# Patient Record
Sex: FEMALE | Race: WHITE | NOT HISPANIC OR LATINO | Employment: FULL TIME
[De-identification: names, ages, dates, MRNs, and addresses within clinical notes are randomized per-mention and may not be internally consistent; named-entity substitution may affect disease eponyms.]

---

## 2017-07-01 ENCOUNTER — HEALTH MAINTENANCE LETTER (OUTPATIENT)
Age: 22
End: 2017-07-01

## 2019-10-03 ENCOUNTER — HEALTH MAINTENANCE LETTER (OUTPATIENT)
Age: 24
End: 2019-10-03

## 2021-01-15 ENCOUNTER — HEALTH MAINTENANCE LETTER (OUTPATIENT)
Age: 26
End: 2021-01-15

## 2021-01-25 ENCOUNTER — OFFICE VISIT (OUTPATIENT)
Dept: FAMILY MEDICINE | Facility: CLINIC | Age: 26
End: 2021-01-25

## 2021-01-25 VITALS
HEIGHT: 67 IN | HEART RATE: 90 BPM | OXYGEN SATURATION: 100 % | BODY MASS INDEX: 27.18 KG/M2 | WEIGHT: 173.2 LBS | DIASTOLIC BLOOD PRESSURE: 70 MMHG | SYSTOLIC BLOOD PRESSURE: 120 MMHG | TEMPERATURE: 97.9 F

## 2021-01-25 DIAGNOSIS — F41.9 ANXIETY: ICD-10-CM

## 2021-01-25 DIAGNOSIS — F33.9 EPISODE OF RECURRENT MAJOR DEPRESSIVE DISORDER, UNSPECIFIED DEPRESSION EPISODE SEVERITY (H): Primary | ICD-10-CM

## 2021-01-25 DIAGNOSIS — G25.81 RESTLESS LEG SYNDROME: ICD-10-CM

## 2021-01-25 DIAGNOSIS — F50.89 OTHER DISORDER OF EATING: ICD-10-CM

## 2021-01-25 DIAGNOSIS — F55.2 CHRONIC LAXATIVE ABUSE: ICD-10-CM

## 2021-01-25 PROBLEM — Z71.89 ACP (ADVANCE CARE PLANNING): Status: ACTIVE | Noted: 2021-01-25

## 2021-01-25 PROBLEM — Z76.89 HEALTH CARE HOME: Status: ACTIVE | Noted: 2021-01-25

## 2021-01-25 LAB
% GRANULOCYTES: 67.1 %
ALBUMIN SERPL-MCNC: 4.3 G/DL (ref 3.6–5.1)
ALP SERPL-CCNC: 52 U/L (ref 33–130)
ALT 1742-6: 9 U/L (ref 0–32)
AST 1920-8: 12 U/L (ref 0–35)
BILIRUB SERPL-MCNC: 0.5 MG/DL (ref 0.2–1.2)
BILIRUBIN DIRECT: 0.2 MG/DL (ref 0.1–0.4)
BUN SERPL-MCNC: 11 MG/DL (ref 7–25)
BUN/CREATININE RATIO: 15.3 (ref 6–22)
CALCIUM SERPL-MCNC: 9.4 MG/DL (ref 8.6–10.3)
CHLORIDE SERPLBLD-SCNC: 104.9 MMOL/L (ref 98–110)
CO2 SERPL-SCNC: 27.9 MMOL/L (ref 20–32)
CREAT SERPL-MCNC: 0.72 MG/DL (ref 0.6–1.3)
GLUCOSE SERPL-MCNC: 78 MG/DL (ref 60–99)
HCT VFR BLD AUTO: 39.5 % (ref 35–47)
HEMOGLOBIN: 13 G/DL (ref 11.7–15.7)
LYMPHOCYTES NFR BLD AUTO: 27 %
MCH RBC QN AUTO: 30.8 PG (ref 26–33)
MCHC RBC AUTO-ENTMCNC: 32.9 G/DL (ref 31–36)
MCV RBC AUTO: 93.7 FL (ref 78–100)
MONOCYTES NFR BLD AUTO: 5.9 %
PLATELET COUNT - QUEST: 287 10^9/L (ref 150–375)
POTASSIUM SERPL-SCNC: 4.48 MMOL/L (ref 3.5–5.3)
PROT SERPL-MCNC: 6.7 G/DL (ref 6.1–8.1)
RBC # BLD AUTO: 4.22 10*12/L (ref 3.8–5.2)
SODIUM SERPL-SCNC: 140.1 MMOL/L (ref 135–146)
WBC # BLD AUTO: 6.9 10*9/L (ref 4–11)

## 2021-01-25 PROCEDURE — 36415 COLL VENOUS BLD VENIPUNCTURE: CPT | Performed by: FAMILY MEDICINE

## 2021-01-25 PROCEDURE — 84443 ASSAY THYROID STIM HORMONE: CPT | Mod: 90 | Performed by: FAMILY MEDICINE

## 2021-01-25 PROCEDURE — 85025 COMPLETE CBC W/AUTO DIFF WBC: CPT | Performed by: FAMILY MEDICINE

## 2021-01-25 PROCEDURE — 80048 BASIC METABOLIC PNL TOTAL CA: CPT | Performed by: FAMILY MEDICINE

## 2021-01-25 PROCEDURE — 83735 ASSAY OF MAGNESIUM: CPT | Mod: 90 | Performed by: FAMILY MEDICINE

## 2021-01-25 PROCEDURE — 80076 HEPATIC FUNCTION PANEL: CPT | Performed by: FAMILY MEDICINE

## 2021-01-25 PROCEDURE — 99204 OFFICE O/P NEW MOD 45 MIN: CPT | Performed by: FAMILY MEDICINE

## 2021-01-25 RX ORDER — MULTIVITAMIN WITH IRON
1 TABLET ORAL DAILY
COMMUNITY
End: 2021-06-07

## 2021-01-25 RX ORDER — CITALOPRAM HYDROBROMIDE 40 MG/1
40 TABLET ORAL DAILY
COMMUNITY
Start: 2020-06-13 | End: 2021-01-25

## 2021-01-25 SDOH — HEALTH STABILITY: MENTAL HEALTH: HOW MANY STANDARD DRINKS CONTAINING ALCOHOL DO YOU HAVE ON A TYPICAL DAY?: 1 OR 2

## 2021-01-25 SDOH — HEALTH STABILITY: MENTAL HEALTH: HOW OFTEN DO YOU HAVE 6 OR MORE DRINKS ON ONE OCCASION?: NOT ASKED

## 2021-01-25 SDOH — HEALTH STABILITY: MENTAL HEALTH: HOW OFTEN DO YOU HAVE A DRINK CONTAINING ALCOHOL?: MONTHLY OR LESS

## 2021-01-25 ASSESSMENT — PATIENT HEALTH QUESTIONNAIRE - PHQ9
SUM OF ALL RESPONSES TO PHQ QUESTIONS 1-9: 14
5. POOR APPETITE OR OVEREATING: MORE THAN HALF THE DAYS

## 2021-01-25 ASSESSMENT — MIFFLIN-ST. JEOR: SCORE: 1550.32

## 2021-01-25 ASSESSMENT — ANXIETY QUESTIONNAIRES
6. BECOMING EASILY ANNOYED OR IRRITABLE: SEVERAL DAYS
IF YOU CHECKED OFF ANY PROBLEMS ON THIS QUESTIONNAIRE, HOW DIFFICULT HAVE THESE PROBLEMS MADE IT FOR YOU TO DO YOUR WORK, TAKE CARE OF THINGS AT HOME, OR GET ALONG WITH OTHER PEOPLE: SOMEWHAT DIFFICULT
2. NOT BEING ABLE TO STOP OR CONTROL WORRYING: MORE THAN HALF THE DAYS
GAD7 TOTAL SCORE: 13
1. FEELING NERVOUS, ANXIOUS, OR ON EDGE: MORE THAN HALF THE DAYS
7. FEELING AFRAID AS IF SOMETHING AWFUL MIGHT HAPPEN: MORE THAN HALF THE DAYS
3. WORRYING TOO MUCH ABOUT DIFFERENT THINGS: MORE THAN HALF THE DAYS
5. BEING SO RESTLESS THAT IT IS HARD TO SIT STILL: MORE THAN HALF THE DAYS

## 2021-01-25 NOTE — NURSING NOTE
Eva is here to establish care and go over medications    Pre-visit Screening:  Immunizations:  up to date declines flu shot today  Colonoscopy:  NA  Mammogram: NA  Asthma Action Test/Plan:  NA  PHQ9:  Done today  GAD7:  Done today  Questioned patient about current smoking habits Pt. quit smoking some time ago.  Ok to leave detailed message on voice mail for today's visit only Yes, phone # [phone]

## 2021-01-25 NOTE — LETTER
[facility]  [address]  [address]  [facility] [address] [phone]      January 25, 2021      Eva Pennington  [address]  [facility] [address]      EMERGENCY CARE PLAN  Presenting Problem Treatment Plan   Questions or concerns during clinic hours I will call the clinic directly:    [facility]  [address] , [address]  [place] [address]  [phone]   Questions or concerns outside clinic hours  I will call the 24 hour line at [phone]   Patient needs to schedule an appointment  I will call the  scheduling line at [phone]   Same day treatment   I will call the clinic first, then  urgent care and/or  express care if needed   Clinic Care Coordinators China Roland RN:  [phone]  [facility] Clinical Support Staff: [phone]    Crisis Services:  Behavioral or Mental Health P ([facility])   [phone]   Emergency treatment--Immediately CALL 551

## 2021-01-25 NOTE — PROGRESS NOTES
Assessment & Plan   Problem List Items Addressed This Visit        Musculoskeletal and Integumentary    Restless leg syndrome    Relevant Medications    sertraline (ZOLOFT) 50 MG tablet    Other Relevant Orders    VENOUS COLLECTION (Completed)    Magnesium (QUEST)       Behavioral    Episode of recurrent major depressive disorder, unspecified depression episode severity (H) - Primary    Relevant Medications    sertraline (ZOLOFT) 50 MG tablet    Other Relevant Orders    MENTAL HEALTH REFERRAL  - Adult; Psychiatry, Outpatient Treatment, Assessments and Testing; General Psychological Testing; Other: [facility] [phone]; We will contact you to schedule the appointment or please call with any questions; ...    Other disorder of eating    Relevant Orders    HEMOGRAM PLATELET DIFF (BFP) (Completed)    Hepatic Panel (BFP)    TSH with free T4 reflex (QUEST)    MENTAL HEALTH REFERRAL  - Adult; Psychiatry, Outpatient Treatment, Assessments and Testing; General Psychological Testing; Other: [facility] [phone]; We will contact you to schedule the appointment or please call with any questions; ...    Chronic laxative abuse    Relevant Orders    Basic Metabolic Panel (BFP)    MENTAL HEALTH REFERRAL  - Adult; Psychiatry, Outpatient Treatment, Assessments and Testing; General Psychological Testing; Other: [facility] [phone]; We will contact you to schedule the appointment or please call with any questions; ...       Other    Anxiety    Relevant Medications    sertraline (ZOLOFT) 50 MG tablet    Other Relevant Orders    MENTAL HEALTH REFERRAL  - Adult; Psychiatry, Outpatient Treatment, Assessments and Testing; General Psychological Testing; Other: [facility] [phone]; We will contact you to schedule the appointment or please call with any questions; ...         1. Episode of recurrent major depressive disorder, unspecified depression episode severity (H)  She would like to change  to a new medications, stop celexa by decreasing dose to 1/2 dose daily for a week, then start sertraline. Side effects including FDA warning discussed. See me back to recheck in 1-2 weeks. Crisis hotline numbers given. I will also refer her to see psychology--ensure correct diagnosis, consider seeing psychiatry.   She also has no plan, denies suicidality but describes mostly feeling inadequate.    - sertraline (ZOLOFT) 50 MG tablet; Take 1 tablet (50 mg) by mouth daily  Dispense: 90 tablet; Refill: 0  - MENTAL HEALTH REFERRAL  - Adult; Psychiatry, Outpatient Treatment, Assessments and Testing; General Psychological Testing; Other: [facility] [phone]; We will contact you to schedule the appointment or please call with any questions; ...    2. Anxiety  See above.  - sertraline (ZOLOFT) 50 MG tablet; Take 1 tablet (50 mg) by mouth daily  Dispense: 90 tablet; Refill: 0  - MENTAL HEALTH REFERRAL  - Adult; Psychiatry, Outpatient Treatment, Assessments and Testing; General Psychological Testing; Other: [facility] [phone]; We will contact you to schedule the appointment or please call with any questions; ...    3. Other disorder of eating  Check labs, referral to [facility], consider referral to eating disorders program. She will discuss with them further.  - HEMOGRAM PLATELET DIFF (BFP)  - Hepatic Panel (BFP)  - TSH with free T4 reflex (QUEST)  - MENTAL HEALTH REFERRAL  - Adult; Psychiatry, Outpatient Treatment, Assessments and Testing; General Psychological Testing; Other: [facility] [phone]; We will contact you to schedule the appointment or please call with any questions; ...    4. Restless leg syndrome  Check mg.    5. Chronic laxative abuse  Check labs.  - Basic Metabolic Panel (BFP)  - MENTAL HEALTH REFERRAL  - Adult; Psychiatry, Outpatient Treatment, Assessments and Testing; General Psychological Testing; Other: [facility] [phone]; We will contact  "you to schedule the appointment or please call with any questions; ...    6}     BMI:   Estimated body mass index is 27.54 kg/m  as calculated from the following:    Height as of this encounter: 1.689 m (5' 6.5\").    Weight as of this encounter: 78.6 kg (173 lb 3.2 oz).         FUTURE APPOINTMENTS:       - Follow-up office or E-visit in 1-2 weeks    No follow-ups on file.    Taya Meredith MD  [facility]    Subjective     Nursing Notes:   Norah Begum CMA  1/25/2021 11:08 AM  Signed  Eva is here to establish care and go over medications    Pre-visit Screening:  Immunizations:  up to date declines flu shot today  Colonoscopy:  NA  Mammogram: NA  Asthma Action Test/Plan:  NA  PHQ9:  Done today  GAD7:  Done today  Questioned patient about current smoking habits Pt. quit smoking some time ago.  Ok to leave detailed message on voice mail for today's visit only Yes, phone # [phone]           Eva Pennington is a 25 year old female who presents to clinic today for the following health issues   HPI     Has been on celexa for a few years but can't tell the difference. Has been on prozac in the past. Had a bad reaction to this--panic attacks. Also has some anxiety. Hasn't been told she is bipolar. Has seen a therapist in the past. Family history anxiety.  Interested in zoloft. Has some some friends on this.  Considering pregnancy within the next year, was previously taking PNV, will start again.  Takes mg for RLS.  Also feels that she might have an eating disorder--has binged and purged, also severely cut back on her calories. Does not excessively exercise. She also regularly uses laxatives, too much, she feels. Last time was last night.   She has had passing suicidal ideation but no plan and is able to contract that she will not harm herself. Mostly she said she has thoughts of worthlessness or inadequacy.  Has been self-hospitalized once in the past few years ago.    Review of Systems " "  Constitutional, HEENT, cardiovascular, pulmonary, gi and gu systems are negative, except as otherwise noted.      Objective    /70 (BP Location: Left arm, Patient Position: Sitting, Cuff Size: Adult Large)   Pulse 90   Temp 97.9  F (36.6  C) (Oral)   Ht 1.689 m (5' 6.5\")   Wt 78.6 kg (173 lb 3.2 oz)   LMP 12/28/2020   SpO2 100%   BMI 27.54 kg/m    Body mass index is 27.54 kg/m .  Physical Exam   GENERAL: healthy, alert and no distress  EYES: Eyes grossly normal to inspection, PERRL and conjunctivae and sclerae normal  HENT: ear canals and TM's normal, nose and mouth without ulcers or lesions  NECK: no adenopathy, no asymmetry, masses, or scars and thyroid normal to palpation  RESP: lungs clear to auscultation - no rales, rhonchi or wheezes  BREAST: normal without masses, tenderness or nipple discharge and no palpable axillary masses or adenopathy  CV: regular rate and rhythm, normal S1 S2, no S3 or S4, no murmur, click or rub, no peripheral edema and peripheral pulses strong  ABDOMEN: soft, nontender, no hepatosplenomegaly, no masses and bowel sounds normal  MS: no gross musculoskeletal defects noted, no edema  SKIN: no suspicious lesions or rashes  NEURO: Normal strength and tone, mentation intact and speech normal  PSYCH: mentation appears normal, affect normal/bright    Results for orders placed or performed in visit on 01/25/21   HEMOGRAM PLATELET DIFF (BFP)     Status: None   Result Value Ref Range    WBC 6.9 4.0 - 11 10*9/L    RBC Count 4.22 3.8 - 5.2 10*12/L    Hemoglobin 13.0 11.7 - 15.7 g/dL    Hematocrit 39.5 35.0 - 47.0 %    MCV 93.7 78 - 100 fL    MCH 30.8 26 - 33 pg    MCHC 32.9 31 - 36 g/dL    Platelet Count 287 150 - 375 10^9/L    % Granulocytes 67.1 %    % Lymphocytes 27.0 %    % Monocytes 5.9 %         "

## 2021-01-25 NOTE — PATIENT INSTRUCTIONS
Assessment & Plan   Problem List Items Addressed This Visit        Musculoskeletal and Integumentary    Restless leg syndrome    Relevant Medications    sertraline (ZOLOFT) 50 MG tablet    Other Relevant Orders    VENOUS COLLECTION (Completed)    Magnesium (QUEST)       Behavioral    Episode of recurrent major depressive disorder, unspecified depression episode severity (H) - Primary    Relevant Medications    sertraline (ZOLOFT) 50 MG tablet    Other Relevant Orders    MENTAL HEALTH REFERRAL  - Adult; Psychiatry, Outpatient Treatment, Assessments and Testing; General Psychological Testing; Other: [facility] [phone]; We will contact you to schedule the appointment or please call with any questions; ...    Other disorder of eating    Relevant Orders    HEMOGRAM PLATELET DIFF (BFP) (Completed)    Hepatic Panel (BFP)    TSH with free T4 reflex (QUEST)    MENTAL HEALTH REFERRAL  - Adult; Psychiatry, Outpatient Treatment, Assessments and Testing; General Psychological Testing; Other: [facility] [phone]; We will contact you to schedule the appointment or please call with any questions; ...    Chronic laxative abuse    Relevant Orders    Basic Metabolic Panel (BFP)    MENTAL HEALTH REFERRAL  - Adult; Psychiatry, Outpatient Treatment, Assessments and Testing; General Psychological Testing; Other: [facility] [phone]; We will contact you to schedule the appointment or please call with any questions; ...       Other    Anxiety    Relevant Medications    sertraline (ZOLOFT) 50 MG tablet    Other Relevant Orders    MENTAL HEALTH REFERRAL  - Adult; Psychiatry, Outpatient Treatment, Assessments and Testing; General Psychological Testing; Other: [facility] [phone]; We will contact you to schedule the appointment or please call with any questions; ...         1. Episode of recurrent major depressive disorder, unspecified depression episode severity (H)  She would like to change  to a new medications, stop celexa by decreasing dose to 1/2 dose daily for a week, then start sertraline. Side effects including FDA warning discussed. See me back to recheck in 1-2 weeks. Crisis hotline numbers given. I will also refer her to see psychology--ensure correct diagnosis, consider seeing psychiatry.   She also has no plan, denies suicidality but describes mostly feeling inadequate.    - sertraline (ZOLOFT) 50 MG tablet; Take 1 tablet (50 mg) by mouth daily  Dispense: 90 tablet; Refill: 0  - MENTAL HEALTH REFERRAL  - Adult; Psychiatry, Outpatient Treatment, Assessments and Testing; General Psychological Testing; Other: [facility] [phone]; We will contact you to schedule the appointment or please call with any questions; ...    2. Anxiety  See above.  - sertraline (ZOLOFT) 50 MG tablet; Take 1 tablet (50 mg) by mouth daily  Dispense: 90 tablet; Refill: 0  - MENTAL HEALTH REFERRAL  - Adult; Psychiatry, Outpatient Treatment, Assessments and Testing; General Psychological Testing; Other: [facility] [phone]; We will contact you to schedule the appointment or please call with any questions; ...    3. Other disorder of eating  Check labs, referral to [facility], consider referral to eating disorders program. She will discuss with them further.  - HEMOGRAM PLATELET DIFF (BFP)  - Hepatic Panel (BFP)  - TSH with free T4 reflex (QUEST)  - MENTAL HEALTH REFERRAL  - Adult; Psychiatry, Outpatient Treatment, Assessments and Testing; General Psychological Testing; Other: [facility] [phone]; We will contact you to schedule the appointment or please call with any questions; ...    4. Restless leg syndrome  Check mg.    5. Chronic laxative abuse  Check labs.  - Basic Metabolic Panel (BFP)  - MENTAL HEALTH REFERRAL  - Adult; Psychiatry, Outpatient Treatment, Assessments and Testing; General Psychological Testing; Other: [facility] [phone]; We will contact  "you to schedule the appointment or please call with any questions; ...    6}     BMI:   Estimated body mass index is 27.54 kg/m  as calculated from the following:    Height as of this encounter: 1.689 m (5' 6.5\").    Weight as of this encounter: 78.6 kg (173 lb 3.2 oz).         FUTURE APPOINTMENTS:       - Follow-up office or E-visit in 1-2 weeks    No follow-ups on file.    Taya Meredith MD  [facility]    "

## 2021-01-26 LAB
MAGNESIUM SERPL-MCNC: 2.3 MG/DL (ref 1.5–2.5)
TSH SERPL-ACNC: 1.88 MIU/L

## 2021-01-26 ASSESSMENT — ANXIETY QUESTIONNAIRES: GAD7 TOTAL SCORE: 13

## 2021-02-10 ENCOUNTER — OFFICE VISIT (OUTPATIENT)
Dept: FAMILY MEDICINE | Facility: CLINIC | Age: 26
End: 2021-02-10

## 2021-02-10 VITALS
OXYGEN SATURATION: 99 % | DIASTOLIC BLOOD PRESSURE: 70 MMHG | HEIGHT: 67 IN | BODY MASS INDEX: 27.09 KG/M2 | WEIGHT: 172.6 LBS | SYSTOLIC BLOOD PRESSURE: 110 MMHG | TEMPERATURE: 98.3 F | HEART RATE: 74 BPM

## 2021-02-10 DIAGNOSIS — F41.9 ANXIETY: Primary | ICD-10-CM

## 2021-02-10 DIAGNOSIS — F33.9 EPISODE OF RECURRENT MAJOR DEPRESSIVE DISORDER, UNSPECIFIED DEPRESSION EPISODE SEVERITY (H): ICD-10-CM

## 2021-02-10 DIAGNOSIS — G25.81 RESTLESS LEG SYNDROME: ICD-10-CM

## 2021-02-10 DIAGNOSIS — F32.89 OTHER DEPRESSION: ICD-10-CM

## 2021-02-10 PROCEDURE — 99213 OFFICE O/P EST LOW 20 MIN: CPT | Performed by: FAMILY MEDICINE

## 2021-02-10 ASSESSMENT — ANXIETY QUESTIONNAIRES
GAD7 TOTAL SCORE: 3
5. BEING SO RESTLESS THAT IT IS HARD TO SIT STILL: NOT AT ALL
7. FEELING AFRAID AS IF SOMETHING AWFUL MIGHT HAPPEN: NOT AT ALL
2. NOT BEING ABLE TO STOP OR CONTROL WORRYING: NOT AT ALL
IF YOU CHECKED OFF ANY PROBLEMS ON THIS QUESTIONNAIRE, HOW DIFFICULT HAVE THESE PROBLEMS MADE IT FOR YOU TO DO YOUR WORK, TAKE CARE OF THINGS AT HOME, OR GET ALONG WITH OTHER PEOPLE: SOMEWHAT DIFFICULT
6. BECOMING EASILY ANNOYED OR IRRITABLE: SEVERAL DAYS
3. WORRYING TOO MUCH ABOUT DIFFERENT THINGS: SEVERAL DAYS
1. FEELING NERVOUS, ANXIOUS, OR ON EDGE: SEVERAL DAYS

## 2021-02-10 ASSESSMENT — PATIENT HEALTH QUESTIONNAIRE - PHQ9
5. POOR APPETITE OR OVEREATING: NOT AT ALL
SUM OF ALL RESPONSES TO PHQ QUESTIONS 1-9: 9

## 2021-02-10 ASSESSMENT — MIFFLIN-ST. JEOR: SCORE: 1547.6

## 2021-02-10 NOTE — PROGRESS NOTES
"Assessment & Plan   Problem List Items Addressed This Visit        Musculoskeletal and Integumentary    Restless leg syndrome       Behavioral    Depression    Episode of recurrent major depressive disorder, unspecified depression episode severity (H)       Other    Anxiety - Primary         1. Anxiety  Continue medications, this is working ok. Call if side effects or problems. See me back in 3 months.    2. Other depression      3. Restless leg syndrome      4. Episode of recurrent major depressive disorder, unspecified depression episode severity (H)        [address]}       FUTURE APPOINTMENTS:       - Follow-up visit in 3 months    No follow-ups on file.    Taya Meredith MD  [facility]    Subjective     Nursing Notes:   Norah Begum, GALLO  2/10/2021  5:17 PM  Signed  Eva is here for med check    Pre-visit Screening:  Immunizations:  up to date  Colonoscopy:  NA  Mammogram: NA  Asthma Action Test/Plan:  NA  PHQ9:  Done today  GAD7:  Done today  Questioned patient about current smoking habits Pt. quit smoking some time ago.  Ok to leave detailed message on voice mail for today's visit only Yes, phone # [phone]           Eva Pennington is a 25 year old female who presents to clinic today for the following health issues   HPI     Here to followup on her anxiety. She has stopped the celexa and is now on sertraline, this is ok but makes her tired. She will try changing to another time     Review of Systems   Constitutional, HEENT, cardiovascular, pulmonary, gi and gu systems are negative, except as otherwise noted.  Except fatigue, bloating, anxiety      Objective    /70 (BP Location: Left arm, Patient Position: Sitting, Cuff Size: Adult Large)   Pulse 74   Temp 98.3  F (36.8  C) (Oral)   Ht 1.689 m (5' 6.5\")   Wt 78.3 kg (172 lb 9.6 oz)   SpO2 99%   BMI 27.44 kg/m    Body mass index is 27.44 kg/m .  Physical Exam   GENERAL: healthy, alert and no distress  MS: no gross " musculoskeletal defects noted, no edema  PSYCH: mentation appears normal, affect normal/bright

## 2021-02-10 NOTE — NURSING NOTE
Eva is here for med check    Pre-visit Screening:  Immunizations:  up to date  Colonoscopy:  NA  Mammogram: NA  Asthma Action Test/Plan:  NA  PHQ9:  Done today  GAD7:  Done today  Questioned patient about current smoking habits Pt. quit smoking some time ago.  Ok to leave detailed message on voice mail for today's visit only Yes, phone # [phone]

## 2021-02-10 NOTE — PATIENT INSTRUCTIONS
Assessment & Plan   Problem List Items Addressed This Visit        Musculoskeletal and Integumentary    Restless leg syndrome       Behavioral    Depression    Episode of recurrent major depressive disorder, unspecified depression episode severity (H)       Other    Anxiety - Primary         1. Anxiety  Continue medications, this is working ok. Call if side effects or problems. See me back in 3 months.    2. Other depression      3. Restless leg syndrome      4. Episode of recurrent major depressive disorder, unspecified depression episode severity (H)        [address]}       FUTURE APPOINTMENTS:       - Follow-up visit in 3 months    No follow-ups on file.    Taya Meredtih MD  [facility]

## 2021-02-11 ASSESSMENT — ANXIETY QUESTIONNAIRES: GAD7 TOTAL SCORE: 3

## 2021-04-26 PROBLEM — F33.9 EPISODE OF RECURRENT MAJOR DEPRESSIVE DISORDER, UNSPECIFIED DEPRESSION EPISODE SEVERITY (H): Status: RESOLVED | Noted: 2021-01-25 | Resolved: 2021-04-26

## 2021-04-26 NOTE — PROGRESS NOTES
SUBJECTIVE:   CC: Eva Pennington is an 25 year old woman who presents for preventive health visit.       Patient has been advised of split billing requirements and indicates understanding: Yes  Healthy Habits:    Do you get at least three servings of calcium containing foods daily (dairy, green leafy vegetables, etc.)? yes    Amount of exercise or daily activities, outside of work: walks, gym    Problems taking medications regularly No    Medication side effects: No    Have you had an eye exam in the past two years? yes    Do you see a dentist twice per year? yes    Do you have sleep apnea, excessive snoring or daytime drowsiness?no    Today's PHQ-2 Score:   PHQ-2 ( 1999 [employer]) 6/3/2014 8/16/2013   Q1: Little interest or pleasure in doing things 0 0   Q2: Feeling down, depressed or hopeless 0 1   PHQ-2 Score 0 1       Do you feel safe in your environment? Yes        Social History     Tobacco Use     Smoking status: Former Smoker     Smokeless tobacco: Former User   Substance Use Topics     Alcohol use: Yes     Frequency: Monthly or less     Drinks per session: 1 or 2     If you drink alcohol do you typically have >3 drinks per day or >7 drinks per week? No                     Reviewed orders with patient.  Reviewed health maintenance and updated orders accordingly - Yes  BP Readings from Last 3 Encounters:   04/28/21 106/70   02/10/21 110/70   01/25/21 120/70    Wt Readings from Last 3 Encounters:   04/28/21 78 kg (172 lb)   02/10/21 78.3 kg (172 lb 9.6 oz)   01/25/21 78.6 kg (173 lb 3.2 oz)                  Patient Active Problem List   Diagnosis     Anxiety     Acne     Depression     CARDIOVASCULAR SCREENING; LDL GOAL LESS THAN 130     Suicidal ideations     Contraceptive management     ACP (advance care planning)     Health Care Home     Other disorder of eating     Restless leg syndrome     Chronic laxative abuse     Past Surgical History:   Procedure Laterality Date     NO HISTORY OF SURGERY          Social History     Tobacco Use     Smoking status: Former Smoker     Smokeless tobacco: Former User   Substance Use Topics     Alcohol use: Yes     Frequency: Monthly or less     Drinks per session: 1 or 2     Family History   Problem Relation Age of Onset     Cancer Maternal Grandfather         Bone CA     Heart Disease Maternal Grandmother         Abnormal rythem     Neurologic Disorder Paternal Grandmother         Brain Annerysym-- Lived through it also had Aeortic annerysym and alive at 62     Hypertension Paternal Grandmother      Heart Disease Maternal Aunt         Enlarged heart  at age 44         Current Outpatient Medications   Medication Sig Dispense Refill     magnesium 250 MG tablet Take 1 tablet by mouth daily       sertraline (ZOLOFT) 50 MG tablet Take 1 tablet (50 mg) by mouth daily 90 tablet 1       Pertinent mammograms are reviewed under the imaging tab.    Pertinent mammograms are reviewed under the imaging tab.  History of abnormal Pap smear: NO - age 21-29 PAP every 3 years recommended, due now, has her period.     Reviewed and updated as needed this visit by clinical staff  Tobacco  Allergies  Meds  Problems  Med Hx  Surg Hx  Fam Hx          Reviewed and updated as needed this visit by Provider                Past Medical History:   Diagnosis Date     Suicidal ideation 2/3/14    Hospitalized at [facility]      Past Surgical History:   Procedure Laterality Date     NO HISTORY OF SURGERY         ROS:  CONSTITUTIONAL: NEGATIVE for fever, chills, change in weight  INTEGUMENTARU/SKIN: NEGATIVE for worrisome rashes, moles or lesions  EYES: NEGATIVE for vision changes or irritation  ENT: NEGATIVE for ear, mouth and throat problems  RESP: NEGATIVE for significant cough or SOB  BREAST: NEGATIVE for masses, tenderness or discharge  CV: NEGATIVE for chest pain, palpitations or peripheral edema  GI: NEGATIVE for nausea, abdominal pain, heartburn, or change in bowel habits  : NEGATIVE for  "unusual urinary or vaginal symptoms. Periods are regular.  MUSCULOSKELETAL: NEGATIVE for significant arthralgias or myalgia  NEURO: NEGATIVE for weakness, dizziness or paresthesias  PSYCHIATRIC: NEGATIVE for changes in mood or affect    OBJECTIVE:   /70 (BP Location: Right arm, Patient Position: Sitting, Cuff Size: Adult Large)   Pulse 94   Temp 98.3  F (36.8  C)   Ht 1.676 m (5' 6\")   Wt 78 kg (172 lb)   LMP 04/28/2021 (Exact Date)   SpO2 99%   BMI 27.76 kg/m    EXAM:  GENERAL: healthy, alert and no distress  EYES: Eyes grossly normal to inspection, PERRL and conjunctivae and sclerae normal  HENT: ear canals and TM's normal, nose and mouth without ulcers or lesions  NECK: no adenopathy, no asymmetry, masses, or scars and thyroid normal to palpation  RESP: lungs clear to auscultation - no rales, rhonchi or wheezes  BREAST: normal without masses, tenderness or nipple discharge and no palpable axillary masses or adenopathy  CV: regular rate and rhythm, normal S1 S2, no S3 or S4, no murmur, click or rub, no peripheral edema and peripheral pulses strong  ABDOMEN: soft, nontender, no hepatosplenomegaly, no masses and bowel sounds normal  MS: no gross musculoskeletal defects noted, no edema  SKIN: no suspicious lesions or rashes  NEURO: Normal strength and tone, mentation intact and speech normal  PSYCH: mentation appears normal, affect normal/bright   exam deferred        ASSESSMENT/PLAN:   1. Routine general medical examination at a health care facility  She declined labs, has had labwork done recently.  followup for pap only apt.    2. Anxiety  This is working well for her. She has also seen a psychologist and has had some testing done. Will be having therapy.  - sertraline (ZOLOFT) 50 MG tablet; Take 1 tablet (50 mg) by mouth daily  Dispense: 90 tablet; Refill: 1    3. Other depression  Controlled.    4. Restless leg syndrome  Controlled.  - sertraline (ZOLOFT) 50 MG tablet; Take 1 tablet (50 mg) by " "mouth daily  Dispense: 90 tablet; Refill: 1    5. Episode of recurrent major depressive disorder, unspecified depression episode severity (H)    - sertraline (ZOLOFT) 50 MG tablet; Take 1 tablet (50 mg) by mouth daily  Dispense: 90 tablet; Refill: 1    Patient has been advised of split billing requirements and indicates understanding: Yes  COUNSELING:   Reviewed preventive health counseling, as reflected in patient instructions       Regular exercise       Healthy diet/nutrition       Family planning       Consider Hep C screening for all patients one time for ages 18-79 years       HIV screeninx in teen years, 1x in adult years, and at intervals if high risk    Estimated body mass index is 27.76 kg/m  as calculated from the following:    Height as of this encounter: 1.676 m (5' 6\").    Weight as of this encounter: 78 kg (172 lb).        She reports that she has quit smoking. She has quit using smokeless tobacco.          Taya Meredith MD  [facility]  "

## 2021-04-26 NOTE — PATIENT INSTRUCTIONS
Preventive Health Recommendations  Female Ages 26 - 39  Yearly exam:   See your health care provider every year in order to    Review health changes.     Discuss preventive care.      Review your medicines if you your doctor has prescribed any.    Until age 30: Get a Pap test every three years (more often if you have had an abnormal result).    After age 30: Talk to your doctor about whether you should have a Pap test every 3 years or have a Pap test with HPV screening every 5 years.   You do not need a Pap test if your uterus was removed (hysterectomy) and you have not had cancer.  You should be tested each year for STDs (sexually transmitted diseases), if you're at risk.   Talk to your provider about how often to have your cholesterol checked.  If you are at risk for diabetes, you should have a diabetes test (fasting glucose).  Shots: Get a flu shot each year. Get a tetanus shot every 10 years.   Nutrition:     Eat at least 5 servings of fruits and vegetables each day.    Eat whole-grain bread, whole-wheat pasta and brown rice instead of white grains and rice.    Get adequate Calcium and Vitamin D.     Lifestyle    Exercise at least 150 minutes a week (30 minutes a day, 5 days of the week). This will help you control your weight and prevent disease.    Limit alcohol to one drink per day.    No smoking.     Wear sunscreen to prevent skin cancer.    See your dentist every six months for an exam and cleaning.  ASSESSMENT/PLAN:   1. Routine general medical examination at a health care facility  She declined labs, has had labwork done recently.  followup for pap only apt.    2. Anxiety  This is working well for her. She has also seen a psychologist and has had some testing done. Will be having therapy.  - sertraline (ZOLOFT) 50 MG tablet; Take 1 tablet (50 mg) by mouth daily  Dispense: 90 tablet; Refill: 1    3. Other depression  Controlled.    4. Restless leg syndrome  Controlled.  - sertraline (ZOLOFT) 50 MG  "tablet; Take 1 tablet (50 mg) by mouth daily  Dispense: 90 tablet; Refill: 1    5. Episode of recurrent major depressive disorder, unspecified depression episode severity (H)    - sertraline (ZOLOFT) 50 MG tablet; Take 1 tablet (50 mg) by mouth daily  Dispense: 90 tablet; Refill: 1    Patient has been advised of split billing requirements and indicates understanding: Yes  COUNSELING:   Reviewed preventive health counseling, as reflected in patient instructions       Regular exercise       Healthy diet/nutrition       Family planning       Consider Hep C screening for all patients one time for ages 18-79 years       HIV screeninx in teen years, 1x in adult years, and at intervals if high risk    Estimated body mass index is 27.76 kg/m  as calculated from the following:    Height as of this encounter: 1.676 m (5' 6\").    Weight as of this encounter: 78 kg (172 lb).        She reports that she has quit smoking. She has quit using smokeless tobacco.          Taya Meredith MD  [facility]  "

## 2021-04-28 ENCOUNTER — OFFICE VISIT (OUTPATIENT)
Dept: FAMILY MEDICINE | Facility: CLINIC | Age: 26
End: 2021-04-28

## 2021-04-28 VITALS
HEART RATE: 94 BPM | TEMPERATURE: 98.3 F | OXYGEN SATURATION: 99 % | DIASTOLIC BLOOD PRESSURE: 70 MMHG | HEIGHT: 66 IN | BODY MASS INDEX: 27.64 KG/M2 | SYSTOLIC BLOOD PRESSURE: 106 MMHG | WEIGHT: 172 LBS

## 2021-04-28 DIAGNOSIS — G25.81 RESTLESS LEG SYNDROME: ICD-10-CM

## 2021-04-28 DIAGNOSIS — F33.9 EPISODE OF RECURRENT MAJOR DEPRESSIVE DISORDER, UNSPECIFIED DEPRESSION EPISODE SEVERITY (H): ICD-10-CM

## 2021-04-28 DIAGNOSIS — F32.89 OTHER DEPRESSION: ICD-10-CM

## 2021-04-28 DIAGNOSIS — F41.9 ANXIETY: ICD-10-CM

## 2021-04-28 DIAGNOSIS — Z00.00 ROUTINE GENERAL MEDICAL EXAMINATION AT A HEALTH CARE FACILITY: Primary | ICD-10-CM

## 2021-04-28 PROCEDURE — 99395 PREV VISIT EST AGE 18-39: CPT | Performed by: FAMILY MEDICINE

## 2021-04-28 ASSESSMENT — ANXIETY QUESTIONNAIRES
7. FEELING AFRAID AS IF SOMETHING AWFUL MIGHT HAPPEN: MORE THAN HALF THE DAYS
GAD7 TOTAL SCORE: 8
2. NOT BEING ABLE TO STOP OR CONTROL WORRYING: MORE THAN HALF THE DAYS
3. WORRYING TOO MUCH ABOUT DIFFERENT THINGS: SEVERAL DAYS
IF YOU CHECKED OFF ANY PROBLEMS ON THIS QUESTIONNAIRE, HOW DIFFICULT HAVE THESE PROBLEMS MADE IT FOR YOU TO DO YOUR WORK, TAKE CARE OF THINGS AT HOME, OR GET ALONG WITH OTHER PEOPLE: SOMEWHAT DIFFICULT
5. BEING SO RESTLESS THAT IT IS HARD TO SIT STILL: SEVERAL DAYS
6. BECOMING EASILY ANNOYED OR IRRITABLE: NOT AT ALL
1. FEELING NERVOUS, ANXIOUS, OR ON EDGE: MORE THAN HALF THE DAYS

## 2021-04-28 ASSESSMENT — PATIENT HEALTH QUESTIONNAIRE - PHQ9
SUM OF ALL RESPONSES TO PHQ QUESTIONS 1-9: 5
5. POOR APPETITE OR OVEREATING: NOT AT ALL

## 2021-04-28 ASSESSMENT — MIFFLIN-ST. JEOR: SCORE: 1536.94

## 2021-04-28 NOTE — NURSING NOTE
Eva is here for a nonfasting CPX with pap.    Pre-Visit Screening:  Immunizations:infromed of shots  Colonoscopy:NA  Mammogram:NA  Asthma Action Test/Plan:NA  PHQ9:today  GAD7:today  Questioned patient about current smoking habits Pt.former smoker  OK to leave a detailed message on voice mail for today's visit yes, phone # [phone]

## 2021-04-29 ASSESSMENT — ANXIETY QUESTIONNAIRES: GAD7 TOTAL SCORE: 8

## 2021-05-05 ENCOUNTER — OFFICE VISIT (OUTPATIENT)
Dept: FAMILY MEDICINE | Facility: CLINIC | Age: 26
End: 2021-05-05

## 2021-05-05 VITALS
WEIGHT: 170.6 LBS | HEART RATE: 80 BPM | TEMPERATURE: 97.6 F | OXYGEN SATURATION: 99 % | HEIGHT: 66 IN | SYSTOLIC BLOOD PRESSURE: 104 MMHG | BODY MASS INDEX: 27.42 KG/M2 | DIASTOLIC BLOOD PRESSURE: 70 MMHG

## 2021-05-05 DIAGNOSIS — F42.8 OTHER OBSESSIVE-COMPULSIVE DISORDERS: ICD-10-CM

## 2021-05-05 DIAGNOSIS — F90.9 ADULT ADHD: Primary | ICD-10-CM

## 2021-05-05 PROCEDURE — 99213 OFFICE O/P EST LOW 20 MIN: CPT | Performed by: FAMILY MEDICINE

## 2021-05-05 RX ORDER — DEXTROAMPHETAMINE SACCHARATE, AMPHETAMINE ASPARTATE MONOHYDRATE, DEXTROAMPHETAMINE SULFATE AND AMPHETAMINE SULFATE 5; 5; 5; 5 MG/1; MG/1; MG/1; MG/1
20 CAPSULE, EXTENDED RELEASE ORAL DAILY
Qty: 30 CAPSULE | Refills: 0 | Status: SHIPPED | OUTPATIENT
Start: 2021-05-05 | End: 2021-05-31

## 2021-05-05 ASSESSMENT — MIFFLIN-ST. JEOR: SCORE: 1530.59

## 2021-05-05 NOTE — PROGRESS NOTES
"Assessment & Plan   Problem List Items Addressed This Visit        Behavioral    Adult ADHD - Primary    Relevant Medications    amphetamine-dextroamphetamine (ADDERALL XR) 20 MG 24 hr capsule    Other obsessive-compulsive disorders         1. Adult ADHD  Start adderall xr. See me back to recheckin 1 month. Discussed side effects, risks, controlled substance agreement signed and discussed.  Continue with therapist and continue to work on life changes.  - amphetamine-dextroamphetamine (ADDERALL XR) 20 MG 24 hr capsule; Take 1 capsule (20 mg) by mouth daily  Dispense: 30 capsule; Refill: 0    2. Other obsessive-compulsive disorders               FUTURE APPOINTMENTS:       - Follow-up visit in 1 month    No follow-ups on file.    Taya Meredith MD  [facility]    Subjective     Nursing Notes:   Noarh Begum CMA  5/5/2021  4:42 PM  Signed  Eva is here for ADHD consult    Pre-visit Screening:  Immunizations:  up to date  Colonoscopy:  NA  Mammogram: NA  Asthma Action Test/Plan:  NA  PHQ9:  NA  GAD7:  NA  Questioned patient about current smoking habits Pt. quit smoking some time ago.  Ok to leave detailed message on voice mail for today's visit only Yes, phone # [phone]           Eva Pennington is a 25 year old female who presents to clinic today for the following health issues   HPI     Here to followup on an evaluation by psychologist for mh reasons, anxiety,depression, adhd and ocd. She showed me the report on her phone and said that they would be faxing this. Recommendation and she would also like to try med for adhd.     Review of Systems   Constitutional, HEENT, cardiovascular, pulmonary, gi and gu systems are negative, except as otherwise noted.  Except anxiety/depression, weight loss intentional      Objective    /70 (BP Location: Left arm, Patient Position: Sitting, Cuff Size: Adult Regular)   Pulse 80   Temp 97.6  F (36.4  C) (Oral)   Ht 1.676 m (5' 6\")   Wt 77.4 kg (170 " lb 9.6 oz)   LMP 04/28/2021 (Exact Date)   SpO2 99%   BMI 27.54 kg/m    Body mass index is 27.54 kg/m .  Physical Exam   GENERAL: healthy, alert and no distress  MS: no gross musculoskeletal defects noted, no edema  NEURO: Normal strength and tone, mentation intact and speech normal  PSYCH: mentation appears normal, affect normal/bright

## 2021-05-05 NOTE — PATIENT INSTRUCTIONS
Assessment & Plan   Problem List Items Addressed This Visit        Behavioral    Adult ADHD - Primary    Relevant Medications    amphetamine-dextroamphetamine (ADDERALL XR) 20 MG 24 hr capsule    Other obsessive-compulsive disorders         1. Adult ADHD  Start adderall xr. See me back to recheckin 1 month. Discussed side effects, risks, controlled substance agreement signed and discussed.  Continue with therapist and continue to work on life changes.  - amphetamine-dextroamphetamine (ADDERALL XR) 20 MG 24 hr capsule; Take 1 capsule (20 mg) by mouth daily  Dispense: 30 capsule; Refill: 0    2. Other obsessive-compulsive disorders               FUTURE APPOINTMENTS:       - Follow-up visit in 1 month    No follow-ups on file.    Taya Meredith MD  [facility]

## 2021-05-05 NOTE — PROGRESS NOTES
Assessment & Plan   Problem List Items Addressed This Visit     None                      FUTURE APPOINTMENTS:       - Follow-up visit in ***    No follow-ups on file.    Taya Meredith MD  [facility]    Subjective     There are no exam notes on file for this visit.     Eva Pennington is a 25 year old female who presents to clinic today for the following health issues   HPI         Review of Systems   {ROS COMP (Optional):298571}      Objective    LMP 04/28/2021 (Exact Date)   There is no height or weight on file to calculate BMI.  Physical Exam   {Exam List (Optional):899601}

## 2021-05-05 NOTE — NURSING NOTE
Eva is here for ADHD consult    Pre-visit Screening:  Immunizations:  up to date  Colonoscopy:  NA  Mammogram: NA  Asthma Action Test/Plan:  NA  PHQ9:  NA  GAD7:  NA  Questioned patient about current smoking habits Pt. quit smoking some time ago.  Ok to leave detailed message on voice mail for today's visit only Yes, phone # [phone]

## 2021-05-05 NOTE — LETTER
[facility]          [address], [address]  [place], [place] [address]  [phone]  Fax [phone]    05/05/21    Patient: Eva Pennington  YOB: 1995  Medical Record Number: [id number]                                                Controlled Substance Agreement  I understand that my care provider has prescribed controlled substances (narcotics, tranquilizers, and/or stimulants) to help manage my condition(s).  I am taking this medicine to help me function or work.  I know that this is strong medicine.  It could have serious side effects and even cause a dependency on the drug.  If I stop these medicines suddenly, I could have severe withdrawal symptoms.    The risks, benefits, and side effects of these medication(s) were explained to me.  I agree that:  1. I will take part in other treatments as advised by my provider.  This may be psychiatry or counseling, physical therapy, behavioral therapy, group treatment, or a referral to a pain clinic.  I will reduce or stop my medicine when my provider tells me to do so.   2. I will take my medicines as prescribed.  I will not change the dose or schedule unless my provider tells me to.  There will be no refills if I  run out early.   I may be contacted at any time without warning and asked to complete a drug test or pill count.   3. I will keep all my appointments at the clinic.  If I miss appointments or fail to follow instructions, my provider may stop my medicine.  4. I will not ask other providers to prescribe controlled substances. And I will not accept controlled substances from other people. If I need another prescribed controlled substance for a new reason, I will notify my provider within one business day.  5. If I enroll in the [place] Medical Marijuana program, I will tell my provider.  I will also sign an agreement to share my medical records with my provider.  6. I will use one pharmacy to fill all of my controlled  substance prescriptions.  If my prescription is mailed to my pharmacy, it may take 5 to 7 days for my medicine to be ready.  7. I understand that my provider, clinic care team, and pharmacy can track controlled substance prescriptions from other providers through a central database (prescription monitoring program).  8. I will bring in my list of medications (or my medicine bottles) each time I come to the clinic.  REV-  04/2016                                                                                                               Page  1 of 2    [facility]      05/05/21    Patient: Eva Pennington  YOB: 1995  Medical Record Number: [id number]    9. Refills of controlled substances will be made only during office hours.  It is up to me to make sure that I do not run out of my medicines on weekends or holidays.    10. I am responsible for my prescriptions.  If the medicine is lost or stolen, it will not be replaced.   I also agree not to share these medicines with anyone.  11. I agree to not use ANY illegal or recreational drugs.  This includes marijuana, cocaine, bath salts or other drugs.  I agree not to use alcohol unless my provider says I may.  I agree to give urine samples whenever asked.  If I fail to give a urine sample, the provider may stop my medicine.     12. I will tell my nurse or provider right away if I become pregnant or have a new medical problem treated outside of [facility].  13. I understand that this medicine can affect my thinking and judgment.  It may be unsafe for me to drive, use machinery and do dangerous tasks.  I will not do any of these things until I know how the medicine affects me.  If my dose changes, I will wait to see how it affects me.  I will contact my provider if I have concerns about medicine side effects.  I understand that if I do not follow any of the conditions above, my prescriptions or treatment may be stopped.    I agree that  my provider, clinic care team, and pharmacy may work with any city, state or federal law enforcement agency that investigates the misuse, sale, or other diversion of my controlled medicine. I will allow my provider to discuss my care with or share a copy of this agreement with any other treating provider, pharmacy or emergency room where I receive care.  I agree to give up (waive) any right of privacy or confidentiality with respect to these authorizations.   I have read this agreement and have asked questions about anything I did not understand.   ___________________________________    ___________________________  Patient Signature                                                           Date and Time  ___________________________________     ____________________________  Witness                                                                            Date and Time  ___________________________________  Taya Meredith MD  REV-  04/2016                                                                                                                                                                 Page 2 of 2

## 2021-05-31 ENCOUNTER — MYC REFILL (OUTPATIENT)
Dept: FAMILY MEDICINE | Facility: CLINIC | Age: 26
End: 2021-05-31

## 2021-05-31 DIAGNOSIS — F90.9 ADULT ADHD: ICD-10-CM

## 2021-06-01 NOTE — TELEPHONE ENCOUNTER
Patient is requesting a refill of  Pending Prescriptions:                       Disp   Refills    amphetamine-dextroamphetamine (ADDERALL X*30 cap*0            Sig: Take 1 capsule (20 mg) by mouth daily    She was last seen for an OV on 5/5/21. Routing to Dr. Meredith for approval or denial of refill request.

## 2021-06-02 RX ORDER — DEXTROAMPHETAMINE SACCHARATE, AMPHETAMINE ASPARTATE MONOHYDRATE, DEXTROAMPHETAMINE SULFATE AND AMPHETAMINE SULFATE 5; 5; 5; 5 MG/1; MG/1; MG/1; MG/1
20 CAPSULE, EXTENDED RELEASE ORAL DAILY
Qty: 30 CAPSULE | Refills: 0 | Status: SHIPPED | OUTPATIENT
Start: 2021-06-02 | End: 2022-08-30

## 2021-06-02 NOTE — TELEPHONE ENCOUNTER
She was told to followup in a month.  I did refill for another month but she should be seen for office visit prior to additional refills.

## 2021-06-07 ENCOUNTER — OFFICE VISIT (OUTPATIENT)
Dept: FAMILY MEDICINE | Facility: CLINIC | Age: 26
End: 2021-06-07

## 2021-06-07 VITALS
DIASTOLIC BLOOD PRESSURE: 70 MMHG | HEART RATE: 110 BPM | TEMPERATURE: 98 F | SYSTOLIC BLOOD PRESSURE: 110 MMHG | BODY MASS INDEX: 26.08 KG/M2 | WEIGHT: 161.6 LBS | OXYGEN SATURATION: 97 %

## 2021-06-07 DIAGNOSIS — F90.9 ADULT ADHD: Primary | ICD-10-CM

## 2021-06-07 DIAGNOSIS — F32.89 OTHER DEPRESSION: ICD-10-CM

## 2021-06-07 DIAGNOSIS — N92.6 ABNORMAL MENSTRUAL PERIODS: ICD-10-CM

## 2021-06-07 LAB — PREG. TEST: NORMAL

## 2021-06-07 PROCEDURE — 99214 OFFICE O/P EST MOD 30 MIN: CPT | Performed by: FAMILY MEDICINE

## 2021-06-07 PROCEDURE — 81025 URINE PREGNANCY TEST: CPT | Performed by: FAMILY MEDICINE

## 2021-06-07 RX ORDER — DEXTROAMPHETAMINE SACCHARATE, AMPHETAMINE ASPARTATE MONOHYDRATE, DEXTROAMPHETAMINE SULFATE AND AMPHETAMINE SULFATE 5; 5; 5; 5 MG/1; MG/1; MG/1; MG/1
20 CAPSULE, EXTENDED RELEASE ORAL DAILY
Qty: 30 CAPSULE | Refills: 0 | Status: SHIPPED | OUTPATIENT
Start: 2021-08-08 | End: 2021-09-07

## 2021-06-07 RX ORDER — DEXTROAMPHETAMINE SACCHARATE, AMPHETAMINE ASPARTATE MONOHYDRATE, DEXTROAMPHETAMINE SULFATE AND AMPHETAMINE SULFATE 5; 5; 5; 5 MG/1; MG/1; MG/1; MG/1
20 CAPSULE, EXTENDED RELEASE ORAL DAILY
Qty: 30 CAPSULE | Refills: 0 | Status: SHIPPED | OUTPATIENT
Start: 2021-07-08 | End: 2021-08-07

## 2021-06-07 RX ORDER — DEXTROAMPHETAMINE SACCHARATE, AMPHETAMINE ASPARTATE MONOHYDRATE, DEXTROAMPHETAMINE SULFATE AND AMPHETAMINE SULFATE 5; 5; 5; 5 MG/1; MG/1; MG/1; MG/1
20 CAPSULE, EXTENDED RELEASE ORAL DAILY
Qty: 30 CAPSULE | Refills: 0 | Status: SHIPPED | OUTPATIENT
Start: 2021-06-07 | End: 2021-07-07

## 2021-06-07 ASSESSMENT — ANXIETY QUESTIONNAIRES
GAD7 TOTAL SCORE: 5
6. BECOMING EASILY ANNOYED OR IRRITABLE: NOT AT ALL
1. FEELING NERVOUS, ANXIOUS, OR ON EDGE: SEVERAL DAYS
5. BEING SO RESTLESS THAT IT IS HARD TO SIT STILL: MORE THAN HALF THE DAYS
IF YOU CHECKED OFF ANY PROBLEMS ON THIS QUESTIONNAIRE, HOW DIFFICULT HAVE THESE PROBLEMS MADE IT FOR YOU TO DO YOUR WORK, TAKE CARE OF THINGS AT HOME, OR GET ALONG WITH OTHER PEOPLE: SOMEWHAT DIFFICULT
7. FEELING AFRAID AS IF SOMETHING AWFUL MIGHT HAPPEN: SEVERAL DAYS
2. NOT BEING ABLE TO STOP OR CONTROL WORRYING: NOT AT ALL
3. WORRYING TOO MUCH ABOUT DIFFERENT THINGS: SEVERAL DAYS

## 2021-06-07 ASSESSMENT — PATIENT HEALTH QUESTIONNAIRE - PHQ9
SUM OF ALL RESPONSES TO PHQ QUESTIONS 1-9: 6
5. POOR APPETITE OR OVEREATING: NOT AT ALL

## 2021-06-07 NOTE — NURSING NOTE
Eva is here for a med check and period questions.    Pre-Visit Screening:  Immunizations:UTD  Colonoscopy:NA  Mammogram:Na  Asthma Action Test/Plan:Na  PHQ9:today  GAD7:today  Questioned patient about current smoking habits Pt.former smoker  OK to leave a detailed message on voice mail for today's visit yes, phone # [phone]

## 2021-06-07 NOTE — PATIENT INSTRUCTIONS
Assessment & Plan   Problem List Items Addressed This Visit        Behavioral    Depression    Adult ADHD - Primary    Relevant Medications    amphetamine-dextroamphetamine (ADDERALL XR) 20 MG 24 hr capsule    amphetamine-dextroamphetamine (ADDERALL XR) 20 MG 24 hr capsule (Start on 7/8/2021)    amphetamine-dextroamphetamine (ADDERALL XR) 20 MG 24 hr capsule (Start on 8/8/2021)      Other Visit Diagnoses     Abnormal menstrual periods        Relevant Orders    HCL Urine Pregnancy Test (BFP) (Completed)         Results for orders placed or performed in visit on 06/07/21   HCL Urine Pregnancy Test (BFP)     Status: None   Result Value Ref Range    Pregnancy Test NEG      1. Adult ADHD  Continue medications. Watch the appetite. See me back for a recheck in 3 months.  - amphetamine-dextroamphetamine (ADDERALL XR) 20 MG 24 hr capsule; Take 1 capsule (20 mg) by mouth daily  Dispense: 30 capsule; Refill: 0  - amphetamine-dextroamphetamine (ADDERALL XR) 20 MG 24 hr capsule; Take 1 capsule (20 mg) by mouth daily  Dispense: 30 capsule; Refill: 0  - amphetamine-dextroamphetamine (ADDERALL XR) 20 MG 24 hr capsule; Take 1 capsule (20 mg) by mouth daily  Dispense: 30 capsule; Refill: 0    2. Other depression  Depression and anxiety are controlled. See therapist as planned.    3. Abnormal menstrual periods  This happened one period. Watch and followup if persistent problems or abnormalities.   - HCL Urine Pregnancy Test (BFP)           FUTURE APPOINTMENTS:       - Follow-up visit in 3 months    No follow-ups on file.    Taya Meredith MD  [facility]

## 2021-06-07 NOTE — PROGRESS NOTES
Assessment & Plan   Problem List Items Addressed This Visit        Behavioral    Depression    Adult ADHD - Primary    Relevant Medications    amphetamine-dextroamphetamine (ADDERALL XR) 20 MG 24 hr capsule    amphetamine-dextroamphetamine (ADDERALL XR) 20 MG 24 hr capsule (Start on 7/8/2021)    amphetamine-dextroamphetamine (ADDERALL XR) 20 MG 24 hr capsule (Start on 8/8/2021)      Other Visit Diagnoses     Abnormal menstrual periods        Relevant Orders    HCL Urine Pregnancy Test (BFP) (Completed)         Results for orders placed or performed in visit on 06/07/21   HCL Urine Pregnancy Test (BFP)     Status: None   Result Value Ref Range    Pregnancy Test NEG      1. Adult ADHD  Continue medications. Watch the appetite. See me back for a recheck in 3 months.  - amphetamine-dextroamphetamine (ADDERALL XR) 20 MG 24 hr capsule; Take 1 capsule (20 mg) by mouth daily  Dispense: 30 capsule; Refill: 0  - amphetamine-dextroamphetamine (ADDERALL XR) 20 MG 24 hr capsule; Take 1 capsule (20 mg) by mouth daily  Dispense: 30 capsule; Refill: 0  - amphetamine-dextroamphetamine (ADDERALL XR) 20 MG 24 hr capsule; Take 1 capsule (20 mg) by mouth daily  Dispense: 30 capsule; Refill: 0    2. Other depression  Depression and anxiety are controlled. See therapist as planned.    3. Abnormal menstrual periods  This happened one period. Watch and followup if persistent problems or abnormalities.   - HCL Urine Pregnancy Test (BFP)           FUTURE APPOINTMENTS:       - Follow-up visit in 3 months    No follow-ups on file.    Taya Meredith MD  [facility]    Subjective     Nursing Notes:   Radha Hartley CMA  6/7/2021 11:42 AM  Signed  Eva is here for a med check and period questions.    Pre-Visit Screening:  Immunizations:UTD  Colonoscopy:NA  Mammogram:Na  Asthma Action Test/Plan:Na  PHQ9:today  GAD7:today  Questioned patient about current smoking habits Pt.former smoker  OK to leave a detailed message on  voice mail for today's visit yes, phone # [phone]            Eva Pennington is a 25 year old female who presents to clinic today for the following health issues   HPI     Here to followup on her adhd, adderall. This has helped a lot with attention, motivation and anxiety.  Is also concerned about decrease in appetite. Is other wise monitoring her calories. Would like to continue this medications.  Next month will start counseling.     Has some questions in her periods. Has noted some changes--usually lasts 2-3 days. Once had a period that it was heavy the first day--slightly different than usual. No intercourse in 2 months, doesn't think she is pregnant. Doesn't feel she needs a pregnancy test today.    occ thoughts of hurting self, but this was mostly last week. Is able to contract for safety. No plan.    Review of Systems   Constitutional, HEENT, cardiovascular, pulmonary, gi and gu systems are negative, except as otherwise noted.  Except change in appetite, vaginal problem/period      Objective    /70 (BP Location: Left arm, Patient Position: Sitting, Cuff Size: Adult Large)   Pulse 110   Temp 98  F (36.7  C) (Oral)   Wt 73.3 kg (161 lb 9.6 oz)   LMP 05/27/2021 (Exact Date)   SpO2 97%   BMI 26.08 kg/m    Body mass index is 26.08 kg/m .  Physical Exam   GENERAL: healthy, alert and no distress  MS: no gross musculoskeletal defects noted, no edema  NEURO: Normal strength and tone, mentation intact and speech normal  PSYCH: mentation appears normal, affect normal/bright    Results for orders placed or performed in visit on 06/07/21 (from the past 24 hour(s))   HCL Urine Pregnancy Test (BFP)   Result Value Ref Range    Pregnancy Test NEG

## 2021-06-08 ASSESSMENT — ANXIETY QUESTIONNAIRES: GAD7 TOTAL SCORE: 5

## 2021-09-05 ENCOUNTER — HEALTH MAINTENANCE LETTER (OUTPATIENT)
Age: 26
End: 2021-09-05

## 2021-10-27 ENCOUNTER — OFFICE VISIT (OUTPATIENT)
Dept: FAMILY MEDICINE | Facility: CLINIC | Age: 26
End: 2021-10-27

## 2021-10-27 VITALS
SYSTOLIC BLOOD PRESSURE: 108 MMHG | BODY MASS INDEX: 25.88 KG/M2 | WEIGHT: 161 LBS | HEART RATE: 84 BPM | DIASTOLIC BLOOD PRESSURE: 72 MMHG | OXYGEN SATURATION: 99 % | TEMPERATURE: 97.9 F | HEIGHT: 66 IN

## 2021-10-27 DIAGNOSIS — G25.81 RESTLESS LEG SYNDROME: ICD-10-CM

## 2021-10-27 DIAGNOSIS — F90.9 ADULT ADHD: Primary | ICD-10-CM

## 2021-10-27 DIAGNOSIS — F41.9 ANXIETY: ICD-10-CM

## 2021-10-27 DIAGNOSIS — Z79.899 CONTROLLED SUBSTANCE AGREEMENT SIGNED: ICD-10-CM

## 2021-10-27 DIAGNOSIS — F33.9 EPISODE OF RECURRENT MAJOR DEPRESSIVE DISORDER, UNSPECIFIED DEPRESSION EPISODE SEVERITY (H): ICD-10-CM

## 2021-10-27 DIAGNOSIS — N92.6 ABNORMAL MENSTRUAL PERIODS: ICD-10-CM

## 2021-10-27 PROCEDURE — 99214 OFFICE O/P EST MOD 30 MIN: CPT | Performed by: FAMILY MEDICINE

## 2021-10-27 RX ORDER — DEXTROAMPHETAMINE SACCHARATE, AMPHETAMINE ASPARTATE MONOHYDRATE, DEXTROAMPHETAMINE SULFATE AND AMPHETAMINE SULFATE 5; 5; 5; 5 MG/1; MG/1; MG/1; MG/1
20 CAPSULE, EXTENDED RELEASE ORAL DAILY
Qty: 30 CAPSULE | Refills: 0 | Status: SHIPPED | OUTPATIENT
Start: 2021-10-27 | End: 2021-11-26

## 2021-10-27 RX ORDER — DEXTROAMPHETAMINE SACCHARATE, AMPHETAMINE ASPARTATE MONOHYDRATE, DEXTROAMPHETAMINE SULFATE AND AMPHETAMINE SULFATE 5; 5; 5; 5 MG/1; MG/1; MG/1; MG/1
20 CAPSULE, EXTENDED RELEASE ORAL DAILY
Qty: 30 CAPSULE | Refills: 0 | Status: SHIPPED | OUTPATIENT
Start: 2021-12-28 | End: 2022-01-27

## 2021-10-27 RX ORDER — DEXTROAMPHETAMINE SACCHARATE, AMPHETAMINE ASPARTATE MONOHYDRATE, DEXTROAMPHETAMINE SULFATE AND AMPHETAMINE SULFATE 5; 5; 5; 5 MG/1; MG/1; MG/1; MG/1
20 CAPSULE, EXTENDED RELEASE ORAL DAILY
Qty: 30 CAPSULE | Refills: 0 | Status: SHIPPED | OUTPATIENT
Start: 2021-11-27 | End: 2021-12-27

## 2021-10-27 ASSESSMENT — ANXIETY QUESTIONNAIRES
GAD7 TOTAL SCORE: 11
1. FEELING NERVOUS, ANXIOUS, OR ON EDGE: MORE THAN HALF THE DAYS
7. FEELING AFRAID AS IF SOMETHING AWFUL MIGHT HAPPEN: MORE THAN HALF THE DAYS
5. BEING SO RESTLESS THAT IT IS HARD TO SIT STILL: NOT AT ALL
6. BECOMING EASILY ANNOYED OR IRRITABLE: MORE THAN HALF THE DAYS
2. NOT BEING ABLE TO STOP OR CONTROL WORRYING: MORE THAN HALF THE DAYS
3. WORRYING TOO MUCH ABOUT DIFFERENT THINGS: NEARLY EVERY DAY
IF YOU CHECKED OFF ANY PROBLEMS ON THIS QUESTIONNAIRE, HOW DIFFICULT HAVE THESE PROBLEMS MADE IT FOR YOU TO DO YOUR WORK, TAKE CARE OF THINGS AT HOME, OR GET ALONG WITH OTHER PEOPLE: SOMEWHAT DIFFICULT

## 2021-10-27 ASSESSMENT — PATIENT HEALTH QUESTIONNAIRE - PHQ9
SUM OF ALL RESPONSES TO PHQ QUESTIONS 1-9: 4
5. POOR APPETITE OR OVEREATING: NOT AT ALL

## 2021-10-27 ASSESSMENT — MIFFLIN-ST. JEOR: SCORE: 1487.04

## 2021-10-27 NOTE — PROGRESS NOTES
Assessment & Plan   Problem List Items Addressed This Visit        Musculoskeletal and Integumentary    Restless leg syndrome    Relevant Medications    sertraline (ZOLOFT) 50 MG tablet       Behavioral    Depression    Relevant Medications    sertraline (ZOLOFT) 50 MG tablet    Adult ADHD - Primary    Relevant Medications    amphetamine-dextroamphetamine (ADDERALL XR) 20 MG 24 hr capsule    amphetamine-dextroamphetamine (ADDERALL XR) 20 MG 24 hr capsule (Start on 11/27/2021)    amphetamine-dextroamphetamine (ADDERALL XR) 20 MG 24 hr capsule (Start on 12/28/2021)       Other    Anxiety    Relevant Medications    sertraline (ZOLOFT) 50 MG tablet    Controlled substance agreement signed--05.05.2021      Other Visit Diagnoses     Abnormal menstrual periods        Relevant Orders    Ob/Gyn Referral         1. Restless leg syndrome    - sertraline (ZOLOFT) 50 MG tablet; Take 1 tablet (50 mg) by mouth daily  Dispense: 90 tablet; Refill: 1    2. Episode of recurrent major depressive disorder, unspecified depression episode severity (H)  Controlled. Refilled.  - sertraline (ZOLOFT) 50 MG tablet; Take 1 tablet (50 mg) by mouth daily  Dispense: 90 tablet; Refill: 1    3. Anxiety    - sertraline (ZOLOFT) 50 MG tablet; Take 1 tablet (50 mg) by mouth daily  Dispense: 90 tablet; Refill: 1    4. Adult ADHD  Refilled.  - amphetamine-dextroamphetamine (ADDERALL XR) 20 MG 24 hr capsule; Take 1 capsule (20 mg) by mouth daily  Dispense: 30 capsule; Refill: 0  - amphetamine-dextroamphetamine (ADDERALL XR) 20 MG 24 hr capsule; Take 1 capsule (20 mg) by mouth daily  Dispense: 30 capsule; Refill: 0  - amphetamine-dextroamphetamine (ADDERALL XR) 20 MG 24 hr capsule; Take 1 capsule (20 mg) by mouth daily  Dispense: 30 capsule; Refill: 0    5. Controlled substance agreement signed      6. Abnormal menstrual periods  Referral done to gynecology  - Ob/Gyn Referral; Future           BMI:   Estimated body mass index is 25.99 kg/m  as calculated  "from the following:    Height as of this encounter: 1.676 m (5' 6\").    Weight as of this encounter: 73 kg (161 lb).         FUTURE APPOINTMENTS:       - Follow-up visit in 6months, call back in 3 months for refills.    No follow-ups on file.    Taya Meredith MD  [facility]    Subjective     Nursing Notes:   Sandi Johnson CMA  10/27/2021  5:18 PM  Signed  Chief Complaint   Patient presents with     Recheck Medication     refill medications     Menstrual Problem     irregular periods the last 3 cycles, periods only last 1 day      Pre-visit Screening:  Immunizations:  up to date  Colonoscopy:  NA  Mammogram: NA  Asthma Action Test/Plan:  NA  PHQ9:  Done today  GAD7:  Done today  Questioned patient about current smoking habits Pt. has never smoked.  Ok to leave detailed message on voice mail for today's visit only Yes, phone # [phone]           Eva Pennington is a 25 year old female who presents to clinic today for the following health issues  HPI     Here to followup on her medications.    Also has some stress related few months. For 1 month had a 1 day period, then the next month also 1 day period. Now just finished a normal 3 day period.  Took a  Pregnancy test 2 months ago. Nothing could cause pregnancy now the past couple of months, so doesn't need a test today.  Just needs a refill on the adderall--is doing well on this dose. Feels like she has evened out a bit on this.  Reviewed pdmp. Consistent with prescribing.        Review of Systems   Constitutional, HEENT, cardiovascular, pulmonary, gi and gu systems are negative, except as otherwise noted.      Objective    /72 (BP Location: Right arm, Patient Position: Sitting, Cuff Size: Adult Regular)   Pulse 84   Temp 97.9  F (36.6  C) (Temporal)   Ht 1.676 m (5' 6\")   Wt 73 kg (161 lb)   LMP 10/22/2021   SpO2 99%   BMI 25.99 kg/m    Body mass index is 25.99 kg/m .  Physical Exam   GENERAL: healthy, alert and no " distress  MS: no gross musculoskeletal defects noted, no edema  NEURO: Normal strength and tone, mentation intact and speech normal  PSYCH: mentation appears normal, affect normal/bright

## 2021-10-27 NOTE — PATIENT INSTRUCTIONS
Assessment & Plan   Problem List Items Addressed This Visit        Musculoskeletal and Integumentary    Restless leg syndrome    Relevant Medications    sertraline (ZOLOFT) 50 MG tablet       Behavioral    Depression    Relevant Medications    sertraline (ZOLOFT) 50 MG tablet    Adult ADHD - Primary    Relevant Medications    amphetamine-dextroamphetamine (ADDERALL XR) 20 MG 24 hr capsule    amphetamine-dextroamphetamine (ADDERALL XR) 20 MG 24 hr capsule (Start on 11/27/2021)    amphetamine-dextroamphetamine (ADDERALL XR) 20 MG 24 hr capsule (Start on 12/28/2021)       Other    Anxiety    Relevant Medications    sertraline (ZOLOFT) 50 MG tablet    Controlled substance agreement signed--05.05.2021      Other Visit Diagnoses     Abnormal menstrual periods        Relevant Orders    Ob/Gyn Referral         1. Restless leg syndrome    - sertraline (ZOLOFT) 50 MG tablet; Take 1 tablet (50 mg) by mouth daily  Dispense: 90 tablet; Refill: 1    2. Episode of recurrent major depressive disorder, unspecified depression episode severity (H)  Controlled. Refilled.  - sertraline (ZOLOFT) 50 MG tablet; Take 1 tablet (50 mg) by mouth daily  Dispense: 90 tablet; Refill: 1    3. Anxiety    - sertraline (ZOLOFT) 50 MG tablet; Take 1 tablet (50 mg) by mouth daily  Dispense: 90 tablet; Refill: 1    4. Adult ADHD  Refilled.  - amphetamine-dextroamphetamine (ADDERALL XR) 20 MG 24 hr capsule; Take 1 capsule (20 mg) by mouth daily  Dispense: 30 capsule; Refill: 0  - amphetamine-dextroamphetamine (ADDERALL XR) 20 MG 24 hr capsule; Take 1 capsule (20 mg) by mouth daily  Dispense: 30 capsule; Refill: 0  - amphetamine-dextroamphetamine (ADDERALL XR) 20 MG 24 hr capsule; Take 1 capsule (20 mg) by mouth daily  Dispense: 30 capsule; Refill: 0    5. Controlled substance agreement signed      6. Abnormal menstrual periods  Referral done to gynecology  - Ob/Gyn Referral; Future           BMI:   Estimated body mass index is 25.99 kg/m  as calculated  "from the following:    Height as of this encounter: 1.676 m (5' 6\").    Weight as of this encounter: 73 kg (161 lb).         FUTURE APPOINTMENTS:       - Follow-up visit in 6months, call back in 3 months for refills.    No follow-ups on file.    Taya Meredith MD  [facility]    "

## 2021-10-27 NOTE — NURSING NOTE
Chief Complaint   Patient presents with     Recheck Medication     refill medications     Menstrual Problem     irregular periods the last 3 cycles, periods only last 1 day      Pre-visit Screening:  Immunizations:  up to date  Colonoscopy:  NA  Mammogram: NA  Asthma Action Test/Plan:  NA  PHQ9:  Done today  GAD7:  Done today  Questioned patient about current smoking habits Pt. has never smoked.  Ok to leave detailed message on voice mail for today's visit only Yes, phone # [phone]

## 2021-10-28 ASSESSMENT — ANXIETY QUESTIONNAIRES: GAD7 TOTAL SCORE: 11

## 2022-03-16 ENCOUNTER — OFFICE VISIT (OUTPATIENT)
Dept: FAMILY MEDICINE | Facility: CLINIC | Age: 27
End: 2022-03-16

## 2022-03-16 VITALS
BODY MASS INDEX: 25.33 KG/M2 | WEIGHT: 157.6 LBS | DIASTOLIC BLOOD PRESSURE: 74 MMHG | OXYGEN SATURATION: 97 % | SYSTOLIC BLOOD PRESSURE: 106 MMHG | HEART RATE: 115 BPM | HEIGHT: 66 IN | TEMPERATURE: 97.6 F

## 2022-03-16 DIAGNOSIS — F33.9 EPISODE OF RECURRENT MAJOR DEPRESSIVE DISORDER, UNSPECIFIED DEPRESSION EPISODE SEVERITY (H): ICD-10-CM

## 2022-03-16 DIAGNOSIS — F90.9 ADULT ADHD: Primary | ICD-10-CM

## 2022-03-16 DIAGNOSIS — F41.9 ANXIETY: ICD-10-CM

## 2022-03-16 DIAGNOSIS — G25.81 RESTLESS LEG SYNDROME: ICD-10-CM

## 2022-03-16 DIAGNOSIS — Z12.4 SCREENING FOR CERVICAL CANCER: ICD-10-CM

## 2022-03-16 DIAGNOSIS — Z76.89 SLEEP CONCERN: ICD-10-CM

## 2022-03-16 PROBLEM — D69.6 THROMBOCYTOPENIA (H): Status: ACTIVE | Noted: 2017-11-14

## 2022-03-16 PROBLEM — B17.9 ACUTE VIRAL HEPATITIS: Status: ACTIVE | Noted: 2017-11-14

## 2022-03-16 PROCEDURE — 99214 OFFICE O/P EST MOD 30 MIN: CPT | Performed by: FAMILY MEDICINE

## 2022-03-16 PROCEDURE — 88142 CYTOPATH C/V THIN LAYER: CPT | Mod: 90 | Performed by: FAMILY MEDICINE

## 2022-03-16 RX ORDER — DEXTROAMPHETAMINE SACCHARATE, AMPHETAMINE ASPARTATE MONOHYDRATE, DEXTROAMPHETAMINE SULFATE AND AMPHETAMINE SULFATE 5; 5; 5; 5 MG/1; MG/1; MG/1; MG/1
20 CAPSULE, EXTENDED RELEASE ORAL DAILY
Qty: 30 CAPSULE | Refills: 0 | Status: SHIPPED | OUTPATIENT
Start: 2022-04-16 | End: 2022-05-16

## 2022-03-16 RX ORDER — DEXTROAMPHETAMINE SACCHARATE, AMPHETAMINE ASPARTATE MONOHYDRATE, DEXTROAMPHETAMINE SULFATE AND AMPHETAMINE SULFATE 5; 5; 5; 5 MG/1; MG/1; MG/1; MG/1
20 CAPSULE, EXTENDED RELEASE ORAL DAILY
Qty: 30 CAPSULE | Refills: 0 | Status: SHIPPED | OUTPATIENT
Start: 2022-03-16 | End: 2022-04-15

## 2022-03-16 RX ORDER — DEXTROAMPHETAMINE SACCHARATE, AMPHETAMINE ASPARTATE MONOHYDRATE, DEXTROAMPHETAMINE SULFATE AND AMPHETAMINE SULFATE 5; 5; 5; 5 MG/1; MG/1; MG/1; MG/1
20 CAPSULE, EXTENDED RELEASE ORAL DAILY
Qty: 30 CAPSULE | Refills: 0 | Status: SHIPPED | OUTPATIENT
Start: 2022-05-17 | End: 2022-06-16

## 2022-03-16 ASSESSMENT — ANXIETY QUESTIONNAIRES
5. BEING SO RESTLESS THAT IT IS HARD TO SIT STILL: NOT AT ALL
2. NOT BEING ABLE TO STOP OR CONTROL WORRYING: MORE THAN HALF THE DAYS
1. FEELING NERVOUS, ANXIOUS, OR ON EDGE: MORE THAN HALF THE DAYS
7. FEELING AFRAID AS IF SOMETHING AWFUL MIGHT HAPPEN: NOT AT ALL
6. BECOMING EASILY ANNOYED OR IRRITABLE: SEVERAL DAYS
3. WORRYING TOO MUCH ABOUT DIFFERENT THINGS: MORE THAN HALF THE DAYS
GAD7 TOTAL SCORE: 8
IF YOU CHECKED OFF ANY PROBLEMS ON THIS QUESTIONNAIRE, HOW DIFFICULT HAVE THESE PROBLEMS MADE IT FOR YOU TO DO YOUR WORK, TAKE CARE OF THINGS AT HOME, OR GET ALONG WITH OTHER PEOPLE: SOMEWHAT DIFFICULT

## 2022-03-16 ASSESSMENT — PATIENT HEALTH QUESTIONNAIRE - PHQ9
SUM OF ALL RESPONSES TO PHQ QUESTIONS 1-9: 9
5. POOR APPETITE OR OVEREATING: SEVERAL DAYS

## 2022-03-16 NOTE — PATIENT INSTRUCTIONS
Assessment & Plan   Problem List Items Addressed This Visit        Musculoskeletal and Integumentary    Restless leg syndrome    Relevant Medications    sertraline (ZOLOFT) 50 MG tablet       Behavioral    Depression    Relevant Medications    sertraline (ZOLOFT) 50 MG tablet    Adult ADHD - Primary    Relevant Medications    amphetamine-dextroamphetamine (ADDERALL XR) 20 MG 24 hr capsule    amphetamine-dextroamphetamine (ADDERALL XR) 20 MG 24 hr capsule (Start on 4/16/2022)    amphetamine-dextroamphetamine (ADDERALL XR) 20 MG 24 hr capsule (Start on 5/17/2022)       Other    Anxiety    Relevant Medications    sertraline (ZOLOFT) 50 MG tablet    Other Relevant Orders    DEPRESSION ACTION PLAN (DAP) (Completed)      Other Visit Diagnoses     Screening for cervical cancer        Relevant Orders    ThinPrep Pap and HPV (mRNA E6/E7) ([facility])    Sleep concern             1. Adult ADHD  Controlled, refilled medications.  - amphetamine-dextroamphetamine (ADDERALL XR) 20 MG 24 hr capsule; Take 1 capsule (20 mg) by mouth daily  Dispense: 30 capsule; Refill: 0  - amphetamine-dextroamphetamine (ADDERALL XR) 20 MG 24 hr capsule; Take 1 capsule (20 mg) by mouth daily  Dispense: 30 capsule; Refill: 0  - amphetamine-dextroamphetamine (ADDERALL XR) 20 MG 24 hr capsule; Take 1 capsule (20 mg) by mouth daily  Dispense: 30 capsule; Refill: 0    2. Anxiety  Refilled medications.  - DEPRESSION ACTION PLAN (DAP)  - sertraline (ZOLOFT) 50 MG tablet; Take 1 tablet (50 mg) by mouth daily  Dispense: 90 tablet; Refill: 1    3. Screening for cervical cancer    - ThinPrep Pap and HPV (mRNA E6/E7)HPV-REFLEX ([facility])    4. Restless leg syndrome  Controlled, refilled.  - sertraline (ZOLOFT) 50 MG tablet; Take 1 tablet (50 mg) by mouth daily  Dispense: 90 tablet; Refill: 1    5. Episode of recurrent major depressive disorder, unspecified depression episode severity (H)    - sertraline (ZOLOFT) 50 MG tablet; Take 1 tablet (50 mg) by mouth daily   "Dispense: 90 tablet; Refill: 1      6. Sleep concern  Discussed that her fatigue could still be lingering effects of covid and she should give this more time. Increase exercise. Offered sleep consult, declined. Should recheck if persistent.       BMI:   Estimated body mass index is 25.44 kg/m  as calculated from the following:    Height as of this encounter: 1.676 m (5' 6\").    Weight as of this encounter: 71.5 kg (157 lb 9.6 oz).         FUTURE APPOINTMENTS:       - Follow-up visit in 6 months, call in 3 months for refill.    No follow-ups on file.    Taya Meredith MD  [facility]    "

## 2022-03-16 NOTE — LETTER
My Depression Action Plan  Name: Eva Pennington   Date of Birth 1995  Date: 3/16/2022    My doctor: Taya Meredith   My clinic: [facility]  [address]  [address]  [facility] [id number] [phone]          GREEN    ZONE   Good Control    What it looks like:     Things are going generally well. You have normal ups and downs. You may even feel depressed from time to time, but bad moods usually last less than a day.   What you need to do:  1. Continue to care for yourself (see self care plan)  2. Check your depression survival kit and update it as needed  3. Follow your physician s recommendations including any medication.  4. Do not stop taking medication unless you consult with your physician first.           YELLOW         ZONE Getting Worse    What it looks like:     Depression is starting to interfere with your life.     It may be hard to get out of bed; you may be starting to isolate yourself from others.    Symptoms of depression are starting to last most all day and this has happened for several days.     You may have suicidal thoughts but they are not constant.   What you need to do:     1. Call your care team. Your response to treatment will improve if you keep your care team informed of your progress. Yellow periods are signs an adjustment may need to be made.     2. Continue your self-care.  Just get dressed and ready for the day.  Don't give yourself time to talk yourself out of it.    3. Talk to someone in your support network.    4. Open up your Depression Self-Care Plan/Wellness Kit.           RED    ZONE Medical Alert - Get Help    What it looks like:     Depression is seriously interfering with your life.     You may experience these or other symptoms: You can t get out of bed most days, can t work or engage in other necessary activities, you have trouble taking care of basic hygiene, or basic responsibilities, thoughts of suicide or death that  will not go away, self-injurious behavior.     What you need to do:  1. Call your care team and request a same-day appointment. If they are not available (weekends or after hours) call your local crisis line, emergency room or 911.          Depression Self-Care Plan / Wellness Kit    Many people find that medication and therapy are helpful treatments for managing depression. In addition, making small changes to your everyday life can help to boost your mood and improve your wellbeing. Below are some tips for you to consider. Be sure to talk with your medical provider and/or behavioral health consultant if your symptoms are worsening or not improving.     Sleep   Sleep hygiene  means all of the habits that support good, restful sleep. It includes maintaining a consistent bedtime and wake time, using your bedroom only for sleeping or sex, and keeping the bedroom dark and free of distractions like a computer, smartphone, or television.     Develop a Healthy Routine  Maintain good hygiene. Get out of bed in the morning, make your bed, brush your teeth, take a shower, and get dressed. Don t spend too much time viewing media that makes you feel stressed. Find time to relax each day.    Exercise  Get some form of exercise every day. This will help reduce pain and release endorphins, the  feel good  chemicals in your brain. It can be as simple as just going for a walk or doing some gardening, anything that will get you moving.      Diet  Strive to eat healthy foods, including fruits and vegetables. Drink plenty of water. Avoid excessive sugar, caffeine, alcohol, and other mood-altering substances.     Stay Connected with Others  Stay in touch with friends and family members.    Manage Your Mood  Try deep breathing, massage therapy, biofeedback, or meditation. Take part in fun activities when you can. Try to find something to smile about each day.     Psychotherapy  Be open to working with a therapist if your provider  recommends it.     Medication  Be sure to take your medication as prescribed. Most anti-depressants need to be taken every day. It usually takes several weeks for medications to work. Not all medicines work for all people. It is important to follow-up with your provider to make sure you have a treatment plan that is working for you. Do not stop your medication abruptly without first discussing it with your provider.    Crisis Resources   These hotlines are for both adults and children. They and are open 24 hours a day, 7 days a week unless noted otherwise.      National Suicide Prevention Lifeline   1-278-225-GKDS (7823)      Crisis Text Line    www.crisistextline.org  Text HOME to 108141 from anywhere in the [place], anytime, about any type of crisis. A live, trained crisis counselor will receive the text and respond quickly.      [facility] Lifeline for LGBTQ Youth  A national crisis intervention and suicide lifeline for LGBTQ youth under 25. Provides a safe place to talk without judgement. Call 1-662.575.6450; text START to 262939 or visit www.thetrevorproject.org to talk to a trained counselor.      For [facility] crisis numbers, visit the [facility] website at:  https://[place].gov/dhs/people-we-serve/adults/health-care/mental-health/resources/crisis-contacts.jsp

## 2022-03-16 NOTE — PROGRESS NOTES
Assessment & Plan   Problem List Items Addressed This Visit        Musculoskeletal and Integumentary    Restless leg syndrome    Relevant Medications    sertraline (ZOLOFT) 50 MG tablet       Behavioral    Depression    Relevant Medications    sertraline (ZOLOFT) 50 MG tablet    Adult ADHD - Primary    Relevant Medications    amphetamine-dextroamphetamine (ADDERALL XR) 20 MG 24 hr capsule    amphetamine-dextroamphetamine (ADDERALL XR) 20 MG 24 hr capsule (Start on 4/16/2022)    amphetamine-dextroamphetamine (ADDERALL XR) 20 MG 24 hr capsule (Start on 5/17/2022)       Other    Anxiety    Relevant Medications    sertraline (ZOLOFT) 50 MG tablet    Other Relevant Orders    DEPRESSION ACTION PLAN (DAP) (Completed)      Other Visit Diagnoses     Screening for cervical cancer        Relevant Orders    ThinPrep Pap and HPV (mRNA E6/E7) ([facility])    Sleep concern             1. Adult ADHD  Controlled, refilled medications.  - amphetamine-dextroamphetamine (ADDERALL XR) 20 MG 24 hr capsule; Take 1 capsule (20 mg) by mouth daily  Dispense: 30 capsule; Refill: 0  - amphetamine-dextroamphetamine (ADDERALL XR) 20 MG 24 hr capsule; Take 1 capsule (20 mg) by mouth daily  Dispense: 30 capsule; Refill: 0  - amphetamine-dextroamphetamine (ADDERALL XR) 20 MG 24 hr capsule; Take 1 capsule (20 mg) by mouth daily  Dispense: 30 capsule; Refill: 0    2. Anxiety  Refilled medications.  - DEPRESSION ACTION PLAN (DAP)  - sertraline (ZOLOFT) 50 MG tablet; Take 1 tablet (50 mg) by mouth daily  Dispense: 90 tablet; Refill: 1    3. Screening for cervical cancer    - ThinPrep Pap and HPV (mRNA E6/E7)HPV-REFLEX ([facility])    4. Restless leg syndrome  Controlled, refilled.  - sertraline (ZOLOFT) 50 MG tablet; Take 1 tablet (50 mg) by mouth daily  Dispense: 90 tablet; Refill: 1    5. Episode of recurrent major depressive disorder, unspecified depression episode severity (H)    - sertraline (ZOLOFT) 50 MG tablet; Take 1 tablet (50 mg) by mouth daily   "Dispense: 90 tablet; Refill: 1      6. Sleep concern  Discussed that her fatigue could still be lingering effects of covid and she should give this more time. Increase exercise. Offered sleep consult, declined. Should recheck if persistent.       BMI:   Estimated body mass index is 25.44 kg/m  as calculated from the following:    Height as of this encounter: 1.676 m (5' 6\").    Weight as of this encounter: 71.5 kg (157 lb 9.6 oz).         FUTURE APPOINTMENTS:       - Follow-up visit in 6 months, call in 3 months for refill.    No follow-ups on file.    Taya Meredith MD  [facility]    Subjective     Nursing Notes:   Sandi Johnson CMA  3/16/2022 12:22 PM  Signed  Chief Complaint   Patient presents with     Recheck Medication     happy with Adderall XR, wants to discuss changing Zoloft      Gyn Exam     pap smear today      Pre-visit Screening:  Immunizations:  up to date  Colonoscopy:  NA  Mammogram: NA  Asthma Action Test/Plan:  NA  PHQ9:  Done today  GAD7:  Done today  Questioned patient about current smoking habits Pt. quit smoking some time ago.  Ok to leave detailed message on voice mail for today's visit only Yes, phone # [phone]           Eva Pennington is a 26 year old female who presents to clinic today for the following health issues   HPI     Here to followup on the adhd. She is doing well on the medication, seems to be controlling her symptoms at this dose. Sometimes she takes it too late and perhaps makes it harder for her to sleep.otherwise no side effects on the medication.  Also has been feeling very tired lately, worried that it could be due to the sertraline. Not sure if this is due to recent covid. Wasn't making her tired prior to covid. Feels that she can't get enough sleep. Not exercising. Had covid end of January. Has been tired since then.   Has always had sleep problems.   Takes melatonin regularly. Has trouble falling asleep. Sleeping many hours  Due for a pap, " "from her physical.        Review of Systems   Constitutional, HEENT, cardiovascular, pulmonary, gi and gu systems are negative, except as otherwise noted.      Objective    /74 (BP Location: Right arm, Patient Position: Sitting, Cuff Size: Adult Regular)   Pulse 115   Temp 97.6  F (36.4  C) (Temporal)   Ht 1.676 m (5' 6\")   Wt 71.5 kg (157 lb 9.6 oz)   LMP 02/21/2022   SpO2 97%   BMI 25.44 kg/m    Body mass index is 25.44 kg/m .  Physical Exam   GENERAL: healthy, alert and no distress   (female): normal female external genitalia, normal urethral meatus, vaginal mucosa, normal cervix/adnexa/uterus without masses or discharge  MS: no gross musculoskeletal defects noted, no edema  NEURO: Normal strength and tone, mentation intact and speech normal  PSYCH: mentation appears normal, affect normal/bright    No results found for any visits on 03/16/22.      "

## 2022-03-16 NOTE — NURSING NOTE
Chief Complaint   Patient presents with     Recheck Medication     happy with Adderall XR, wants to discuss changing Zoloft      Gyn Exam     pap smear today      Pre-visit Screening:  Immunizations:  up to date  Colonoscopy:  NA  Mammogram: NA  Asthma Action Test/Plan:  NA  PHQ9:  Done today  GAD7:  Done today  Questioned patient about current smoking habits Pt. quit smoking some time ago.  Ok to leave detailed message on voice mail for today's visit only Yes, phone # [phone]

## 2022-03-17 ASSESSMENT — ANXIETY QUESTIONNAIRES: GAD7 TOTAL SCORE: 8

## 2022-03-19 LAB
CLINICAL HISTORY - QUEST: NORMAL
COMMENT - QUEST: NORMAL
CYTOTECHNOLOGIST - QUEST: NORMAL
DESCRIPTIVE DIAGNOSIS - QUEST: NORMAL
LAST PAP DX - QUEST: NORMAL
LMP - QUEST: NORMAL
PREV BX DX - QUEST: NORMAL
SOURCE: NORMAL
STATEMENT OF ADEQUACY - QUEST: NORMAL

## 2022-06-12 ENCOUNTER — HEALTH MAINTENANCE LETTER (OUTPATIENT)
Age: 27
End: 2022-06-12

## 2022-06-27 ENCOUNTER — MYC REFILL (OUTPATIENT)
Dept: FAMILY MEDICINE | Facility: CLINIC | Age: 27
End: 2022-06-27

## 2022-06-27 DIAGNOSIS — F90.9 ADULT ADHD: ICD-10-CM

## 2022-06-27 RX ORDER — DEXTROAMPHETAMINE SACCHARATE, AMPHETAMINE ASPARTATE MONOHYDRATE, DEXTROAMPHETAMINE SULFATE AND AMPHETAMINE SULFATE 5; 5; 5; 5 MG/1; MG/1; MG/1; MG/1
20 CAPSULE, EXTENDED RELEASE ORAL DAILY
Qty: 30 CAPSULE | Refills: 0 | Status: CANCELLED | OUTPATIENT
Start: 2022-06-27

## 2022-06-28 RX ORDER — DEXTROAMPHETAMINE SACCHARATE, AMPHETAMINE ASPARTATE MONOHYDRATE, DEXTROAMPHETAMINE SULFATE AND AMPHETAMINE SULFATE 5; 5; 5; 5 MG/1; MG/1; MG/1; MG/1
20 CAPSULE, EXTENDED RELEASE ORAL DAILY
Qty: 30 CAPSULE | Refills: 0 | Status: SHIPPED | OUTPATIENT
Start: 2022-07-28 | End: 2022-08-30

## 2022-06-28 RX ORDER — DEXTROAMPHETAMINE SACCHARATE, AMPHETAMINE ASPARTATE MONOHYDRATE, DEXTROAMPHETAMINE SULFATE AND AMPHETAMINE SULFATE 5; 5; 5; 5 MG/1; MG/1; MG/1; MG/1
20 CAPSULE, EXTENDED RELEASE ORAL DAILY
Qty: 30 CAPSULE | Refills: 0 | Status: SHIPPED | OUTPATIENT
Start: 2022-08-28 | End: 2022-08-30

## 2022-06-28 RX ORDER — DEXTROAMPHETAMINE SACCHARATE, AMPHETAMINE ASPARTATE MONOHYDRATE, DEXTROAMPHETAMINE SULFATE AND AMPHETAMINE SULFATE 5; 5; 5; 5 MG/1; MG/1; MG/1; MG/1
20 CAPSULE, EXTENDED RELEASE ORAL DAILY
Qty: 30 CAPSULE | Refills: 0 | Status: SHIPPED | OUTPATIENT
Start: 2022-06-28 | End: 2022-07-28

## 2022-06-28 NOTE — TELEPHONE ENCOUNTER
Patient is requesting a refill of  Pending Prescriptions:                       Disp   Refills    amphetamine-dextroamphetamine (ADDERALL X*30 cap*0            Sig: Take 1 capsule (20 mg) by mouth daily    Patient last seen on 3/16 for med check, routing to Dr. Meredith to send in next 3 months.

## 2022-08-30 ENCOUNTER — MYC REFILL (OUTPATIENT)
Dept: FAMILY MEDICINE | Facility: CLINIC | Age: 27
End: 2022-08-30

## 2022-08-30 DIAGNOSIS — F90.9 ADULT ADHD: ICD-10-CM

## 2022-08-30 RX ORDER — DEXTROAMPHETAMINE SACCHARATE, AMPHETAMINE ASPARTATE MONOHYDRATE, DEXTROAMPHETAMINE SULFATE AND AMPHETAMINE SULFATE 5; 5; 5; 5 MG/1; MG/1; MG/1; MG/1
20 CAPSULE, EXTENDED RELEASE ORAL DAILY
Qty: 30 CAPSULE | Refills: 0 | Status: SHIPPED | OUTPATIENT
Start: 2022-08-30 | End: 2023-08-25

## 2022-08-30 NOTE — TELEPHONE ENCOUNTER
Eva Pennington is requesting a refill of:    Pending Prescriptions:                       Disp   Refills    amphetamine-dextroamphetamine (ADDERALL X*30 cap*0            Sig: Take 1 capsule (20 mg) by mouth daily    Needs to be sent to [facility]. Cancelled her other prescription that was sent to [place] [facility]

## 2022-10-23 ENCOUNTER — HEALTH MAINTENANCE LETTER (OUTPATIENT)
Age: 27
End: 2022-10-23

## 2023-01-20 ENCOUNTER — OFFICE VISIT (OUTPATIENT)
Dept: FAMILY MEDICINE | Facility: CLINIC | Age: 28
End: 2023-01-20

## 2023-01-20 VITALS
DIASTOLIC BLOOD PRESSURE: 74 MMHG | BODY MASS INDEX: 25.33 KG/M2 | TEMPERATURE: 98.4 F | HEART RATE: 109 BPM | OXYGEN SATURATION: 98 % | SYSTOLIC BLOOD PRESSURE: 116 MMHG | HEIGHT: 66 IN | WEIGHT: 157.6 LBS

## 2023-01-20 DIAGNOSIS — F41.9 ANXIETY: ICD-10-CM

## 2023-01-20 DIAGNOSIS — N92.6 LATE PERIOD: Primary | ICD-10-CM

## 2023-01-20 LAB — PREG. TEST: NORMAL

## 2023-01-20 PROCEDURE — 81025 URINE PREGNANCY TEST: CPT | Performed by: FAMILY MEDICINE

## 2023-01-20 PROCEDURE — 84443 ASSAY THYROID STIM HORMONE: CPT | Mod: 90 | Performed by: FAMILY MEDICINE

## 2023-01-20 PROCEDURE — 99214 OFFICE O/P EST MOD 30 MIN: CPT | Performed by: FAMILY MEDICINE

## 2023-01-20 PROCEDURE — 36415 COLL VENOUS BLD VENIPUNCTURE: CPT | Performed by: FAMILY MEDICINE

## 2023-01-20 ASSESSMENT — ANXIETY QUESTIONNAIRES
GAD7 TOTAL SCORE: 11
IF YOU CHECKED OFF ANY PROBLEMS ON THIS QUESTIONNAIRE, HOW DIFFICULT HAVE THESE PROBLEMS MADE IT FOR YOU TO DO YOUR WORK, TAKE CARE OF THINGS AT HOME, OR GET ALONG WITH OTHER PEOPLE: VERY DIFFICULT
7. FEELING AFRAID AS IF SOMETHING AWFUL MIGHT HAPPEN: MORE THAN HALF THE DAYS
3. WORRYING TOO MUCH ABOUT DIFFERENT THINGS: MORE THAN HALF THE DAYS
2. NOT BEING ABLE TO STOP OR CONTROL WORRYING: MORE THAN HALF THE DAYS
GAD7 TOTAL SCORE: 11
1. FEELING NERVOUS, ANXIOUS, OR ON EDGE: MORE THAN HALF THE DAYS
6. BECOMING EASILY ANNOYED OR IRRITABLE: NOT AT ALL
5. BEING SO RESTLESS THAT IT IS HARD TO SIT STILL: SEVERAL DAYS

## 2023-01-20 ASSESSMENT — PATIENT HEALTH QUESTIONNAIRE - PHQ9
5. POOR APPETITE OR OVEREATING: MORE THAN HALF THE DAYS
SUM OF ALL RESPONSES TO PHQ QUESTIONS 1-9: 9

## 2023-01-20 NOTE — PROGRESS NOTES
"Assessment & Plan   Problem List Items Addressed This Visit        Other    Anxiety    Relevant Medications    sertraline (ZOLOFT) 50 MG tablet    Other Relevant Orders    Ob/Gyn Referral   Other Visit Diagnoses     Late period    -  Primary    Relevant Orders    Urine Pregnancy Test ([facility]FP) (Completed)    VENOUS COLLECTION (Completed)    TSH WITH FREE T4 REFLEX ([facility]) (Completed)    Ob/Gyn Referral         1. Late period  She is very anxious about her missed period and getting pregnant. upt today negative, will check a tsh and followup with gynecology for fertility counseling.  - Urine Pregnancy Test (BFP)  - VENOUS COLLECTION  - TSH WITH FREE T4 REFLEX ([facility])  - Ob/Gyn Referral; Future    2. Anxiety  Restart sertraline, side effects including FDA warning discussed. Recheck with  Me in clinic in 1-2 weeks.  - sertraline (ZOLOFT) 50 MG tablet; Take 0.5 tablets (25 mg) by mouth daily  Dispense: 30 tablet; Refill: 0  - Ob/Gyn Referral; Future           BMI:   Estimated body mass index is 25.44 kg/m  as calculated from the following:    Height as of this encounter: 1.676 m (5' 6\").    Weight as of this encounter: 71.5 kg (157 lb 9.6 oz).         FUTURE APPOINTMENTS:       - Follow-up visit in 1-2 weeks    No follow-ups on file.    Taya Meredith MD  [facility]    Subjective     Nursing Notes:   Sandi Johnson CMA  1/20/2023  2:02 PM  Signed  Chief Complaint   Patient presents with     Menstrual Problem     Trying to conceive, period is 5 days late but had a negative test at home, she is worried why her period is late, has been trying for a few months     Numbness     Numbness in her toes, started 1 month, has poor circulation     Pre-visit Screening:  Immunizations:  up to date  Colonoscopy:  NA  Mammogram: NA  Asthma Action Test/Plan:  NA  PHQ9:  NA  GAD7:  NA  Questioned patient about current smoking habits Pt. quit smoking some time ago.  Ok to leave detailed message on voice mail for today's " "visit only Yes, phone # [phone]           Eva Pennington is a 27 year old female who presents to clinic today for the following health issues   HPI     Here with .   She isn't taking adderall and sertraline.  Her period is 5 days late and they have been trying to get pregnant for a few months. But negative pregnancy test.  She is concerned that something is wrong. Is hoping to get a hormone screening.    She's been off her medications. She has been off these for a few months.  Has been taking ovulation tests. Didn't ovulate when expected.   Denies SI.        Review of Systems   Constitutional, HEENT, cardiovascular, pulmonary, gi and gu systems are negative, except as otherwise noted.      Objective    /74 (BP Location: Right arm, Patient Position: Sitting, Cuff Size: Adult Regular)   Pulse 109   Temp 98.4  F (36.9  C) (Temporal)   Ht 1.676 m (5' 6\")   Wt 71.5 kg (157 lb 9.6 oz)   LMP 12/16/2022   SpO2 98%   BMI 25.44 kg/m    Body mass index is 25.44 kg/m .  Physical Exam   GENERAL: healthy, alert and no distress  MS: no gross musculoskeletal defects noted, no edema  NEURO: Normal strength and tone, mentation intact and speech normal  PSYCH: mentation appears normal, affect normal/bright  Tearful and anxious appearing    Results for orders placed or performed in visit on 01/20/23   Urine Pregnancy Test (BFP)     Status: None   Result Value Ref Range    Pregnancy Test NEG    TSH WITH FREE T4 REFLEX ([facility])     Status: None   Result Value Ref Range    TSH 1.95 mIU/L         "

## 2023-01-20 NOTE — NURSING NOTE
Chief Complaint   Patient presents with     Menstrual Problem     Trying to conceive, period is 5 days late but had a negative test at home, she is worried why her period is late, has been trying for a few months     Numbness     Numbness in her toes, started 1 month, has poor circulation     Pre-visit Screening:  Immunizations:  up to date  Colonoscopy:  NA  Mammogram: NA  Asthma Action Test/Plan:  NA  PHQ9:  NA  GAD7:  NA  Questioned patient about current smoking habits Pt. quit smoking some time ago.  Ok to leave detailed message on voice mail for today's visit only Yes, phone # [phone]

## 2023-01-21 LAB — TSH SERPL-ACNC: 1.95 MIU/L

## 2023-06-18 ENCOUNTER — HEALTH MAINTENANCE LETTER (OUTPATIENT)
Age: 28
End: 2023-06-18

## 2023-08-16 NOTE — PROGRESS NOTES
"Assessment & Plan   Problem List Items Addressed This Visit    None  Visit Diagnoses       Screening for diabetes mellitus    -  Primary    Relevant Orders    VENOUS COLLECTION    Glucose Fasting (BFP)    Lipid Panel (BFP)           1. Screening for diabetes mellitus  Check labs.  - VENOUS COLLECTION; Future  - Glucose Fasting (BFP); Future  - Lipid Panel (BFP); Future    2. Discomfort of both ears  Normal ear exam, she was recently on abx for her ears, followup if no improvement in 2-3 weeks.          BMI:   Estimated body mass index is 27.12 kg/m  as calculated from the following:    Height as of this encounter: 1.676 m (5' 6\").    Weight as of this encounter: 76.2 kg (168 lb).         FUTURE APPOINTMENTS:       - Follow-up visit in 2-3 weeks if needed.    No follow-ups on file.    Taya Meredith MD  [facility]    Subjective     Nursing Notes:   Sandi Johnson CMA  8/25/2023  1:53 PM  Signed  Chief Complaint   Patient presents with    Diabetes     Recently diagnosed with PCOS they advised she would be at higher risk for diabetes, discuss insulin resistance testing or checking for diabetes    Ear Problem     Getting over a cold, recently had an ear infection, both ears still feel plugged      Pre-visit Screening:  Immunizations:  up to date  Colonoscopy:  NA  Mammogram: NA  Asthma Action Test/Plan:  NA  PHQ9:  NA  GAD7:  NA  Questioned patient about current smoking habits Pt. has never smoked.  Ok to leave detailed message on voice mail for today's visit only Yes, phone # [phone]       Eva Pennington is a 27 year old female who presents to clinic today for the following health issues   HPI     Here with . Has a recent pcos diagnosis. Having a lot of problem losing weight. Her thyroid was checked with gynecology, this was normal. No family history diabetes. They put her on letrazole, is trying to have a baby.    Last week and finished a course of abx. Left ear feels like it still " "hasn't popped. Not painful, is uncomfortable.        Review of Systems   Constitutional, HEENT, cardiovascular, pulmonary, gi and gu systems are negative, except as otherwise noted.      Objective    /74 (BP Location: Right arm, Patient Position: Sitting, Cuff Size: Adult Regular)   Pulse 98   Temp 98.1  F (36.7  C) (Temporal)   Ht 1.676 m (5' 6\")   Wt 76.2 kg (168 lb)   SpO2 98%   BMI 27.12 kg/m    Body mass index is 27.12 kg/m .  Physical Exam   GENERAL: healthy, alert and no distress  HENT: ear canals and TM's normal, nose and mouth without ulcers or lesions  RESP: lungs clear to auscultation - no rales, rhonchi or wheezes  CV: regular rate and rhythm, normal S1 S2, no S3 or S4, no murmur, click or rub, no peripheral edema and peripheral pulses strong  MS: no gross musculoskeletal defects noted, no edema  NEURO: Normal strength and tone, mentation intact and speech normal  PSYCH: mentation appears normal, affect normal/bright    No results found for any visits on 08/25/23.      "

## 2023-08-17 ENCOUNTER — OFFICE VISIT (OUTPATIENT)
Dept: URGENT CARE | Facility: URGENT CARE | Age: 28
End: 2023-08-17
Payer: COMMERCIAL

## 2023-08-17 VITALS
SYSTOLIC BLOOD PRESSURE: 110 MMHG | HEART RATE: 82 BPM | BODY MASS INDEX: 26.81 KG/M2 | OXYGEN SATURATION: 99 % | DIASTOLIC BLOOD PRESSURE: 74 MMHG | WEIGHT: 166.1 LBS | TEMPERATURE: 98.5 F | RESPIRATION RATE: 16 BRPM

## 2023-08-17 DIAGNOSIS — H65.91 OME (OTITIS MEDIA WITH EFFUSION), RIGHT: Primary | ICD-10-CM

## 2023-08-17 DIAGNOSIS — R07.0 THROAT PAIN: ICD-10-CM

## 2023-08-17 LAB
DEPRECATED S PYO AG THROAT QL EIA: NEGATIVE
GROUP A STREP BY PCR: NOT DETECTED

## 2023-08-17 PROCEDURE — 87651 STREP A DNA AMP PROBE: CPT

## 2023-08-17 PROCEDURE — 99203 OFFICE O/P NEW LOW 30 MIN: CPT

## 2023-08-17 RX ORDER — AMOXICILLIN 500 MG/1
1000 CAPSULE ORAL 3 TIMES DAILY
Qty: 42 CAPSULE | Refills: 0 | Status: SHIPPED | OUTPATIENT
Start: 2023-08-17 | End: 2023-08-25

## 2023-08-17 ASSESSMENT — PAIN SCALES - GENERAL: PAINLEVEL: MODERATE PAIN (5)

## 2023-08-17 NOTE — PATIENT INSTRUCTIONS
Strep test negative.  Take the antibiotic as prescribed and finish the full course even if symptoms improve.  Try yogurt with active cultures or probiotics such as Culturelle daily to help prevent diarrhea while using antibiotics.  Get plenty of rest and drink fluids.  Can use Tylenol as needed for pain and fever.  Maximum dose of Tylenol is 4000mg in a 24 hour period of time.

## 2023-08-17 NOTE — PROGRESS NOTES
ASSESSMENT:  (H65.91) OME (otitis media with effusion), right  (primary encounter diagnosis)  Plan: amoxicillin (AMOXIL) 500 MG capsule    (R07.0) Throat pain  Plan: Streptococcus A Rapid Screen w/Reflex to PCR -         Clinic Collect, Group A Streptococcus PCR         Throat Swab    PLAN:  Informed the patient that the strep test is negative.  Otitis media patient instructions discussed and provided.  We discussed the need to take the antibiotic as prescribed and finish the full course even if symptoms improve.  We also discussed trying yogurt with active cultures or probiotic such as Culturelle daily to help prevent diarrhea will take the antibiotic.  Informed the patient to get plenty of rest, drink fluids and use Tylenol as needed for pain and fever with max dose Tylenol being 4000 mg in 24-hour period of time.  Instructed the patient to return to clinic with any new or worsening symptoms.  Patient acknowledged her understanding of the above plan.    The use of Dragon/Propers dictation services may have been used to construct the content in this note; any grammatical or spelling errors are non-intentional. Please contact the author of this note directly if you are in need of any clarification.      KODY Rebollar CNP      SUBJECTIVE:   Eva Pennington is a 27 year old female presenting with a chief complaint of fever, runny nose, cough - non-productive, ear pain bilateral, and sore throat.  Onset of symptoms was 1 day ago.  Course of illness is improving.    Patient denies: fatigue, vomiting, and diarrhea  Treatment measures tried include DayQuil and ibuprofen.  Predisposing factors include None.    The patient did not do an at home COVID test.     ROS:  Negative except noted above.    OBJECTIVE:  GENERAL APPEARANCE: healthy, alert and no distress  EYES: EOMI,  PERRL, conjunctiva clear  HENT: TM erythematous right, TM congested/bulging right, TM fluid right, and oral mucous membranes moist, no  erythema noted  NECK: supple, nontender, no lymphadenopathy  RESP: lungs clear to auscultation - no rales, rhonchi or wheezes  CV: regular rates and rhythm, normal S1 S2, no murmur noted  SKIN: no suspicious lesions or rashes    Rapid Strep test: Negative

## 2023-08-21 ENCOUNTER — OFFICE VISIT (OUTPATIENT)
Dept: URGENT CARE | Facility: URGENT CARE | Age: 28
End: 2023-08-21
Payer: COMMERCIAL

## 2023-08-21 VITALS
SYSTOLIC BLOOD PRESSURE: 103 MMHG | WEIGHT: 169.6 LBS | HEART RATE: 80 BPM | DIASTOLIC BLOOD PRESSURE: 72 MMHG | OXYGEN SATURATION: 99 % | TEMPERATURE: 97.7 F | RESPIRATION RATE: 20 BRPM | BODY MASS INDEX: 27.37 KG/M2

## 2023-08-21 DIAGNOSIS — R09.81 NASAL CONGESTION: Primary | ICD-10-CM

## 2023-08-21 PROCEDURE — 99213 OFFICE O/P EST LOW 20 MIN: CPT

## 2023-08-21 NOTE — PATIENT INSTRUCTIONS
Use Mucinex 12 hour tablets, nasal saline spray, [place] Pot and humidifier for your nasal congestion.  You can use artificial tears for your eye symptoms.  Your ears look good in the clinic today as there is no signs of infection.

## 2023-08-21 NOTE — PROGRESS NOTES
ASSESSMENT:  (R09.81) Nasal congestion  (primary encounter diagnosis)    PLAN:  Informed the patient to use Mucinex 12-hour tablets, nasal saline spray, [place] pot and humidifier for her nasal congestion.  We discussed utilizing artificial tears for eye symptoms.  Informed her that her ears look good in the clinic today as there is no signs of infection.  Discussed the need to return to clinic with any new or worsening symptoms.  Patient acknowledged her understanding of the above plan.    The use of Dragon/Yec[employer]isation services may have been used to construct the content in this note; any grammatical or spelling errors are non-intentional. Please contact the author of this note directly if you are in need of any clarification.      Darion Melo, KODY CNP      SUBJECTIVE:   Eva Pennington is a 27 year old female presenting with a chief complaint of fever, chills, runny nose, cough - non-productive, and ear pain bilateral.  The patient also reports having dry eyes starting over the past day or so.  Onset of symptoms was 6 day(s) ago.  Course of illness is worsening nasal congestion.    Patient denies: vomiting and diarrhea  Treatment measures tried include amoxicillin for ear infection and decongestant/expectorant.  Predisposing factors include None.    ROS:  Negative except noted above.    OBJECTIVE:  /72   Pulse 80   Temp 97.7  F (36.5  C) (Tympanic)   Resp 20   Wt 76.9 kg (169 lb 9.6 oz)   SpO2 99%   BMI 27.37 kg/m    GENERAL APPEARANCE: healthy, alert and no distress  EYES: EOMI,  PERRL, conjunctiva clear  HENT: ear canals and TM's normal.  Nose and mouth without ulcers, erythema or lesions  NECK: supple, nontender, no lymphadenopathy  RESP: lungs clear to auscultation - no rales, rhonchi or wheezes  CV: regular rates and rhythm, normal S1 S2, no murmur noted  SKIN: no suspicious lesions or rashes

## 2023-08-25 ENCOUNTER — OFFICE VISIT (OUTPATIENT)
Dept: FAMILY MEDICINE | Facility: CLINIC | Age: 28
End: 2023-08-25

## 2023-08-25 VITALS
TEMPERATURE: 98.1 F | BODY MASS INDEX: 27 KG/M2 | OXYGEN SATURATION: 98 % | WEIGHT: 168 LBS | DIASTOLIC BLOOD PRESSURE: 74 MMHG | HEART RATE: 98 BPM | SYSTOLIC BLOOD PRESSURE: 104 MMHG | HEIGHT: 66 IN

## 2023-08-25 DIAGNOSIS — Z13.1 SCREENING FOR DIABETES MELLITUS: Primary | ICD-10-CM

## 2023-08-25 DIAGNOSIS — H92.03 DISCOMFORT OF BOTH EARS: ICD-10-CM

## 2023-08-25 PROCEDURE — 99214 OFFICE O/P EST MOD 30 MIN: CPT | Performed by: FAMILY MEDICINE

## 2023-08-25 RX ORDER — LETROZOLE 2.5 MG/1
TABLET, FILM COATED ORAL
COMMUNITY
Start: 2023-07-24

## 2023-08-25 NOTE — NURSING NOTE
Chief Complaint   Patient presents with    Diabetes     Recently diagnosed with PCOS they advised she would be at higher risk for diabetes, discuss insulin resistance testing or checking for diabetes    Ear Problem     Getting over a cold, recently had an ear infection, both ears still feel plugged      Pre-visit Screening:  Immunizations:  up to date  Colonoscopy:  NA  Mammogram: NA  Asthma Action Test/Plan:  NA  PHQ9:  NA  GAD7:  NA  Questioned patient about current smoking habits Pt. has never smoked.  Ok to leave detailed message on voice mail for today's visit only Yes, phone # [phone]

## 2023-11-02 ENCOUNTER — MYC REFILL (OUTPATIENT)
Dept: FAMILY MEDICINE | Facility: CLINIC | Age: 28
End: 2023-11-02

## 2023-11-02 DIAGNOSIS — F41.9 ANXIETY: ICD-10-CM

## 2023-11-10 DIAGNOSIS — F41.9 ANXIETY: ICD-10-CM

## 2023-11-13 NOTE — TELEPHONE ENCOUNTER
Eva Pennington is requesting a refill of:    Refused Prescriptions:                       Disp   Refills    sertraline (ZOLOFT) 50 MG tablet [Pharmacy*                Sig: Take 1/2 (one-half) tablet by mouth once daily  Refused By: ALEJANDRO ROSALES  Reason for Refusal: Patient needs appointment

## 2023-12-26 ENCOUNTER — MEDICAL CORRESPONDENCE (OUTPATIENT)
Dept: HEALTH INFORMATION MANAGEMENT | Facility: CLINIC | Age: 28
End: 2023-12-26
Payer: COMMERCIAL

## 2024-01-23 ENCOUNTER — ANCILLARY PROCEDURE (OUTPATIENT)
Dept: GENERAL RADIOLOGY | Facility: CLINIC | Age: 29
End: 2024-01-23
Attending: OBSTETRICS & GYNECOLOGY
Payer: COMMERCIAL

## 2024-01-23 DIAGNOSIS — N97.9 FEMALE INFERTILITY: ICD-10-CM

## 2024-01-23 PROCEDURE — 58340 CATHETER FOR HYSTEROGRAPHY: CPT

## 2024-06-17 PROBLEM — Z76.89 HEALTH CARE HOME: Status: RESOLVED | Noted: 2021-01-25 | Resolved: 2024-06-17

## 2024-08-11 ENCOUNTER — HEALTH MAINTENANCE LETTER (OUTPATIENT)
Age: 29
End: 2024-08-11

## 2025-08-17 ENCOUNTER — HEALTH MAINTENANCE LETTER (OUTPATIENT)
Age: 30
End: 2025-08-17